# Patient Record
Sex: FEMALE | Race: WHITE | Employment: OTHER | URBAN - NONMETROPOLITAN AREA
[De-identification: names, ages, dates, MRNs, and addresses within clinical notes are randomized per-mention and may not be internally consistent; named-entity substitution may affect disease eponyms.]

---

## 2017-05-20 ENCOUNTER — NURSE TRIAGE (OUTPATIENT)
Dept: ADMINISTRATIVE | Age: 77
End: 2017-05-20

## 2017-10-17 ENCOUNTER — HOSPITAL ENCOUNTER (OUTPATIENT)
Dept: GENERAL RADIOLOGY | Age: 77
Discharge: HOME | End: 2017-10-17
Payer: MEDICARE

## 2017-10-17 ENCOUNTER — HOSPITAL ENCOUNTER (OUTPATIENT)
Age: 77
Discharge: HOME | End: 2017-10-17
Payer: MEDICARE

## 2017-10-17 DIAGNOSIS — R52 PAIN: ICD-10-CM

## 2017-10-17 PROCEDURE — 73610 X-RAY EXAM OF ANKLE: CPT

## 2017-10-17 PROCEDURE — 73630 X-RAY EXAM OF FOOT: CPT

## 2017-10-24 ENCOUNTER — HOSPITAL ENCOUNTER (OUTPATIENT)
Dept: MRI IMAGING | Age: 77
Discharge: HOME | End: 2017-10-24
Payer: MEDICARE

## 2017-10-24 DIAGNOSIS — M25.571 RIGHT ANKLE PAIN, UNSPECIFIED CHRONICITY: ICD-10-CM

## 2017-10-24 PROCEDURE — 73718 MRI LOWER EXTREMITY W/O DYE: CPT

## 2017-10-24 PROCEDURE — 73721 MRI JNT OF LWR EXTRE W/O DYE: CPT

## 2018-02-16 ENCOUNTER — HOSPITAL ENCOUNTER (OUTPATIENT)
Dept: MRI IMAGING | Age: 78
Discharge: HOME | End: 2018-02-16
Payer: MEDICARE

## 2018-02-16 DIAGNOSIS — M47.812 OSTEOARTHRITIS OF CERVICAL SPINE, UNSPECIFIED SPINAL OSTEOARTHRITIS COMPLICATION STATUS: ICD-10-CM

## 2018-02-16 PROCEDURE — 72141 MRI NECK SPINE W/O DYE: CPT

## 2018-07-06 ENCOUNTER — HOSPITAL ENCOUNTER (EMERGENCY)
Age: 78
Discharge: HOME | End: 2018-07-06
Attending: EMERGENCY MEDICINE
Payer: MEDICARE

## 2018-07-06 ENCOUNTER — APPOINTMENT (OUTPATIENT)
Dept: GENERAL RADIOLOGY | Age: 78
End: 2018-07-06
Payer: MEDICARE

## 2018-07-06 VITALS
RESPIRATION RATE: 18 BRPM | HEART RATE: 79 BPM | DIASTOLIC BLOOD PRESSURE: 88 MMHG | OXYGEN SATURATION: 96 % | TEMPERATURE: 98.4 F | WEIGHT: 266 LBS | BODY MASS INDEX: 50.22 KG/M2 | SYSTOLIC BLOOD PRESSURE: 148 MMHG | HEIGHT: 61 IN

## 2018-07-06 DIAGNOSIS — L03.011 CELLULITIS OF RIGHT MIDDLE FINGER: Primary | ICD-10-CM

## 2018-07-06 LAB
ALBUMIN SERPL-MCNC: 3.4 GM/DL (ref 3.4–5)
ALP BLD-CCNC: 79 U/L (ref 46–116)
ALT SERPL-CCNC: 25 U/L (ref 14–63)
ANION GAP: 6 MEQ/L (ref 8–16)
ANISOCYTOSIS: ABNORMAL
AST SERPL-CCNC: 16 U/L (ref 15–37)
BASOPHILS # BLD: 0.3 % (ref 0–3)
BILIRUB SERPL-MCNC: 0.5 MG/DL (ref 0.2–1)
BUN BLDV-MCNC: 23 MG/DL (ref 7–18)
CHLORIDE BLD-SCNC: 103 MEQ/L (ref 98–107)
CO2: 29 MEQ/L (ref 21–32)
CREAT SERPL-MCNC: 1 MG/DL (ref 0.6–1.3)
EOSINOPHILS RELATIVE PERCENT: 2.2 % (ref 0–4)
GFR, ESTIMATED: 57 ML/MIN/1.73M2
GLUCOSE BLD-MCNC: 171 MG/DL (ref 74–106)
HCT VFR BLD CALC: 39.5 % (ref 37–47)
HEMOGLOBIN: 12.8 GM/DL (ref 12–16)
LYMPHOCYTES # BLD: 12 % (ref 15–47)
MCH RBC QN AUTO: 30.4 PG (ref 27–31)
MCHC RBC AUTO-ENTMCNC: 32.3 GM/DL (ref 33–37)
MCV RBC AUTO: 94.1 FL (ref 81–99)
MONOCYTES: 5.5 % (ref 0–12)
PDW BLD-RTO: 16.1 % (ref 11.5–14.5)
PLATELET # BLD: 248 THOU/MM3 (ref 130–400)
PLATELET ESTIMATE: ABNORMAL
PMV BLD AUTO: 7.7 FL (ref 7.4–10.4)
POC CALCIUM: 9.4 MG/DL (ref 8.5–10.1)
POTASSIUM SERPL-SCNC: 4 MEQ/L (ref 3.5–5.1)
RBC # BLD: 4.2 MILL/MM3 (ref 4.2–5.4)
RBC # BLD: ABNORMAL 10*6/UL
SCAN OF BLOOD SMEAR: NORMAL
SEDIMENTATION RATE, ERYTHROCYTE: 27 MM/HR (ref 0–20)
SEGS: 80 % (ref 43–75)
SODIUM BLD-SCNC: 138 MEQ/L (ref 136–145)
TOTAL PROTEIN: 7.3 GM/DL (ref 6.4–8.2)
WBC # BLD: 9.5 THOU/MM3 (ref 4.8–10.8)

## 2018-07-06 PROCEDURE — 85025 COMPLETE CBC W/AUTO DIFF WBC: CPT

## 2018-07-06 PROCEDURE — 87075 CULTR BACTERIA EXCEPT BLOOD: CPT

## 2018-07-06 PROCEDURE — 87147 CULTURE TYPE IMMUNOLOGIC: CPT

## 2018-07-06 PROCEDURE — 99283 EMERGENCY DEPT VISIT LOW MDM: CPT

## 2018-07-06 PROCEDURE — 36415 COLL VENOUS BLD VENIPUNCTURE: CPT

## 2018-07-06 PROCEDURE — 87205 SMEAR GRAM STAIN: CPT

## 2018-07-06 PROCEDURE — 87070 CULTURE OTHR SPECIMN AEROBIC: CPT

## 2018-07-06 PROCEDURE — 85651 RBC SED RATE NONAUTOMATED: CPT

## 2018-07-06 PROCEDURE — 73140 X-RAY EXAM OF FINGER(S): CPT

## 2018-07-06 PROCEDURE — 80053 COMPREHEN METABOLIC PANEL: CPT

## 2018-07-06 ASSESSMENT — PAIN DESCRIPTION - DESCRIPTORS: DESCRIPTORS: ACHING

## 2018-07-06 ASSESSMENT — PAIN SCALES - GENERAL: PAINLEVEL_OUTOF10: 2

## 2018-07-06 ASSESSMENT — PAIN DESCRIPTION - ORIENTATION: ORIENTATION: LEFT

## 2018-07-06 ASSESSMENT — PAIN DESCRIPTION - FREQUENCY: FREQUENCY: CONTINUOUS

## 2018-07-06 ASSESSMENT — PAIN DESCRIPTION - PAIN TYPE: TYPE: ACUTE PAIN

## 2018-07-06 ASSESSMENT — PAIN DESCRIPTION - LOCATION: LOCATION: FINGER (COMMENT WHICH ONE)

## 2018-07-07 NOTE — ED PROVIDER NOTES
[facility]  eMERGENCY dEPARTMENT eNCOUnter             Ramon Chacko 21 COMPLAINT    Chief Complaint   Patient presents with    Wound Infection     Left middle finger       Nurses Notes reviewed and I agree except as noted in the HPI. HPI    Joan Yao is a 70 y.o. female who presents stating that about two weeks ago she sustained a minor burn to the dorsum of the left long finger over DIP joint, which already had extensive arthritic nodules. She then developed infection in the wound, and the whole finger became swollen and painful. Her doctor placed her on a Z-pack 4 days ago, and all of the redness is better, but the swollen area at the DIPJ is still very painful, with what appear to be tiny pustules forming over the area. She feels a little nauseated, and is very worried about the finger. Her sugar has sometimes been in the 300's since the infection started. Pain is currently 2/10, worse if she touches the area or does not elevate it. No drainage has been noted. REVIEW OF SYSTEMS      Review of Systems   Constitutional: Positive for malaise/fatigue. Negative for fever. Skin: Positive for itching (the finger has been itching as it heals. ). Neurological: Negative for sensory change and focal weakness. Psychiatric/Behavioral: The patient is nervous/anxious. All other systems reviewed and are negative. PAST MEDICAL HISTORY     has a past medical history of Blood clot in vein; Blood transfusion; Hypertension; Hypothyroidism; Palpitation; Parathyroid adenoma; Sinus infection; Sleep apnea; Stomach ache; Type II or unspecified type diabetes mellitus without mention of complication, not stated as uncontrolled; and Wheeze. SURGICAL HISTORY     has a past surgical history that includes Hemorrhoid surgery; knee surgery; lymph node dissection; Vena Cava Filter Placement (2007); Cardiac catheterization;  Appendectomy (age 31); partial hysterectomy (cervix not removed) (); Hysterectomy (); Breast biopsy (); Breast lumpectomy (); Dental surgery (); Neuroma surgery (); Carpal tunnel release; and back surgery (2014). CURRENT MEDICATIONS    Discharge Medication List as of 2018 11:25 PM      CONTINUE these medications which have NOT CHANGED    Details   ACETAMINOPHEN EXTRA STRENGTH PO Take 1 tablet by mouth as needed      Chromium Picolinate (CHROMIUM PICOLATE PO) Take 1,000 mcg by mouth daily      Coenzyme Q10 (COQ10) 100 MG CAPS Take 100 mg by mouth daily      insulin detemir (LEVEMIR) 100 UNIT/ML injection pen Inject 16 Units into the skin daily      insulin 70-30 (HUMULIN 70/30) (70-30) 100 UNIT/ML injection Inject 37 Units into the skin nightly      Olive Leaf Extract 250 MG CAPS Take 500 mg by mouth daily      Potassium Chloride (KLOR-CON 10 PO) Take 10 mEq by mouth daily      Vitamin E (YOHANA-PLUS E PO) Take 800 Units by mouth daily      insulin lispro (HUMALOG) 100 UNIT/ML injection vial Inject 26 Units into the skin 3 times daily      aspirin 325 MG tablet Take 325 mg by mouth daily      Insulin Lispro, Human, (HUMALOG KWIKPEN SC) Inject into the skin      metoprolol (TOPROL-XL) 25 MG XL tablet Take 25 mg by mouth daily      furosemide (LASIX) 40 MG tablet Take 1 tablet by mouth daily. , Disp-30 tablet, R-5      ALPHA-LIPOIC ACID PO Take 1 capsule by mouth daily. levothyroxine (SYNTHROID) 50 MCG tablet 2 tabs po daily         MAGNESIUM PO Take 1 capsule by mouth daily. insulin NPH (HUMULIN N) 100 UNIT/ML injection Inject 37 Units into the skin nightly. 1/2 dose last night             ALLERGIES    is allergic to codeine; meloxicam; pcn [penicillins]; clindamycin/lincomycin; exenatide; levothyroxine; minocycline; other; sulfa antibiotics; and metformin. FAMILY HISTORY    indicated that her mother is . She indicated that her father is .     family history includes Arthritis in her mother; Diabetes 16.1 (*)     SEGS 80.0 (*)     Lymphocytes 12.0 (*)     All other components within normal limits   COMPREHENSIVE METABOLIC PANEL - Abnormal; Notable for the following:     Glucose 171 (*)     BUN 23 (*)     All other components within normal limits   SEDIMENTATION RATE - Abnormal; Notable for the following:     Sed Rate 27 (*)     All other components within normal limits   GLOMERULAR FILTRATION RATE, ESTIMATED - Abnormal; Notable for the following:     GFR, Estimated 57 (*)     All other components within normal limits   ANION GAP - Abnormal; Notable for the following: Anion Gap 6.0 (*)     All other components within normal limits   ANAEROBIC AND AEROBIC CULTURE   SCAN OF BLOOD SMEAR       Vitals:    Vitals:    07/06/18 2154 07/06/18 2155 07/06/18 2350   BP:  (!) 148/88    Pulse: 79     Resp: 20  18   Temp: 98.4 °F (36.9 °C)     TempSrc: Oral     SpO2: 96%     Weight: 266 lb (120.7 kg)     Height: 5' 1\" (1.549 m)         EMERGENCY DEPARTMENT COURSE:    After discussion with the patient, I cleansed an area over one of the pustules and with the tip of a 27 ga needle opened the surface, and got cultures of the white material, scant amount. The area was cleansed again, and bandage applied. An Alumafoam splint is placed over the finger tip for protection. Plan of care discussed, orthopedic follow up encouraged. FINAL IMPRESSION      1. Cellulitis of right middle finger        DISPOSITION/PLAN    DISPOSITION Decision To Discharge 07/06/2018 11:22:35 PM      PATIENT REFERRED TO:    oio    Schedule an appointment as soon as possible for a visit   call Monday, ask to make an appointment with the hand specialist.      DISCHARGE MEDICATIONS:    Discharge Medication List as of 7/6/2018 11:25 PM       Finish Zithromax as prescribed.        (Please note that portions of this note were completed with a voice recognition program.  Efforts were made to edit the dictations but occasionally words are mis-transcribed.) Nithin Fall MD  07/07/18 0079

## 2018-07-07 NOTE — ED NOTES
Dr. Liliya Donato speaking with patient and daughter regarding test results.      Donna Kidney, LPN  72/60/24 [id number]

## 2018-07-07 NOTE — ED NOTES
Patient presents to ED with C/O left middle finger cellulitis x 2 weeks. Patient had completed antibodic treatment and was to follow up with primary Dr. But do to holiday was unable to. Today 2 nd knuckle swollen, red, blistered, no drainage noted, patient afebrile,pink and warm.      Soniya Cullen LPN  47/14/96 [id number]

## 2018-07-10 LAB
AEROBIC CULTURE: NORMAL
ANAEROBIC CULTURE: NORMAL
GRAM STAIN RESULT: NORMAL

## 2018-07-11 ENCOUNTER — HOSPITAL ENCOUNTER (EMERGENCY)
Age: 78
Discharge: HOME | End: 2018-07-11
Attending: EMERGENCY MEDICINE
Payer: MEDICARE

## 2018-07-11 VITALS
HEIGHT: 64 IN | HEART RATE: 84 BPM | SYSTOLIC BLOOD PRESSURE: 153 MMHG | WEIGHT: 266 LBS | OXYGEN SATURATION: 95 % | BODY MASS INDEX: 45.41 KG/M2 | RESPIRATION RATE: 18 BRPM | TEMPERATURE: 99.3 F | DIASTOLIC BLOOD PRESSURE: 78 MMHG

## 2018-07-11 DIAGNOSIS — L03.114 CELLULITIS OF LEFT UPPER EXTREMITY: Primary | ICD-10-CM

## 2018-07-11 PROCEDURE — 99283 EMERGENCY DEPT VISIT LOW MDM: CPT

## 2018-07-11 PROCEDURE — L3913 HFO W/O JOINTS CF: HCPCS

## 2018-07-11 ASSESSMENT — PAIN DESCRIPTION - LOCATION: LOCATION: HAND

## 2018-07-11 ASSESSMENT — PAIN DESCRIPTION - ORIENTATION: ORIENTATION: LEFT

## 2018-07-12 NOTE — ED PROVIDER NOTES
Raj Lu  [address] [address] [place] [address]  Phone: [phone]    Emergency Department encounter      CHIEF COMPLAINT    Chief Complaint   Patient presents with    Wound Infection     left hand finger       HPI    Shade Dress is a 70 y.o. female who presents Above-noted complaint. Patient has left middle finger irritation. Skull and on for about 21 days. States it started with a burn. She developed some redness and then streakiness upper arm. She has multiple allergies and was placed on Zithromax. Zithromax actually helped. The culture did show some non-MRSA species and they suggested putting on Keflex although she was leery and did not want to do it. Her primary care doctors did another course of Z-Berny.     PAST MEDICAL HISTORY    Past Medical History:   Diagnosis Date    Blood clot in vein 2005    bilateral venous legs    Blood transfusion     Hypertension     Hypothyroidism     Palpitation     Parathyroid adenoma     Sinus infection 9/12    Sleep apnea     wears CPAP    Stomach ache     recent    Type II or unspecified type diabetes mellitus without mention of complication, not stated as uncontrolled     Wheeze     recently wheezing a lot       SURGICAL HISTORY    Past Surgical History:   Procedure Laterality Date    APPENDECTOMY  age 31    BACK SURGERY  08/29/2014    done at [facility]   Dr. Byron Taylor    partial breast removed for benign cysts    BREAST LUMPECTOMY  2010    CARDIAC CATHETERIZATION      CARPAL TUNNEL RELEASE      failed   [address]  2010    implants    HEMORRHOID SURGERY      HYSTERECTOMY  1977    KNEE SURGERY      bilateral left 2007 right 2005    LYMPH NODE DISSECTION      left groin    NEUROMA SURGERY  1992    PARTIAL HYSTERECTOMY  1973    VENA CAVA FILTER PLACEMENT  2007    shad tulip filter       CURRENT MEDICATIONS    Current Outpatient Rx   Medication Sig secondary infection to the arthritic changes even a gouty tophi. Patient is adamant it is not related to gout or other inflammatory arthropathies. She is on appropriate antibiotics for her which could have good coverage on the skin although rather variable with all her allergies there is no other better options that she is willing to try. I don't feel it is septic joint and do not feel would say infected tendon. I would like her to refer to orthopedics. If they wanted incise and drain or evaluate they may want to look at this in the next 24 hours especially in light of her recent redness and continued problems. For now we'll continue to called cellulitis and arthritic       SCREENINGS  BP (!) 153/78   Pulse 84   Temp 99.3 °F (37.4 °C) (Temporal)   Resp 18   Ht 5' 4\" (1.626 m)   Wt 266 lb (120.7 kg)   SpO2 95%   BMI 45.66 kg/m²      No orders to display       Screening For Hypertension and Follow-up (#317)   previously diagnosed with hypertension and not applicable for screen      Screening For Tobacco Use and Cessation Intervention (#226):   reports that she quit smoking about 15 years ago. Her smoking use included Cigarettes. She has never used smokeless tobacco.  Non-smoker not applicable for screen        FINAL IMPRESSION    1.  Cellulitis of left upper extremity      Infected tophus    PATIENT REFERRED TO:  [address] [address]  [facility] Kameron Guzman at 12:10      DISCHARGE MEDICATIONS:  Discharge Medication List as of 7/11/2018  8:30 PM             Sheri Bedoya MD  07/11/18 [id number]

## 2018-07-12 NOTE — ED TRIAGE NOTES
Pt states seen several times for infection in left hand finger. Pain and swelling worsening with treatment.

## 2018-08-06 ENCOUNTER — OFFICE VISIT (OUTPATIENT)
Dept: PULMONOLOGY | Age: 78
End: 2018-08-06
Payer: MEDICARE

## 2018-08-06 VITALS
TEMPERATURE: 98.3 F | BODY MASS INDEX: 45.07 KG/M2 | HEIGHT: 64 IN | WEIGHT: 264 LBS | HEART RATE: 77 BPM | SYSTOLIC BLOOD PRESSURE: 128 MMHG | DIASTOLIC BLOOD PRESSURE: 61 MMHG | OXYGEN SATURATION: 96 %

## 2018-08-06 DIAGNOSIS — R06.00 DYSPNEA, UNSPECIFIED TYPE: Primary | ICD-10-CM

## 2018-08-06 DIAGNOSIS — Z72.0 TOBACCO ABUSE: ICD-10-CM

## 2018-08-06 DIAGNOSIS — R93.89 ABNORMAL CHEST XRAY: ICD-10-CM

## 2018-08-06 PROCEDURE — 1123F ACP DISCUSS/DSCN MKR DOCD: CPT | Performed by: INTERNAL MEDICINE

## 2018-08-06 PROCEDURE — 1090F PRES/ABSN URINE INCON ASSESS: CPT | Performed by: INTERNAL MEDICINE

## 2018-08-06 PROCEDURE — 1101F PT FALLS ASSESS-DOCD LE1/YR: CPT | Performed by: INTERNAL MEDICINE

## 2018-08-06 PROCEDURE — G8598 ASA/ANTIPLAT THER USED: HCPCS | Performed by: INTERNAL MEDICINE

## 2018-08-06 PROCEDURE — 1036F TOBACCO NON-USER: CPT | Performed by: INTERNAL MEDICINE

## 2018-08-06 PROCEDURE — 4040F PNEUMOC VAC/ADMIN/RCVD: CPT | Performed by: INTERNAL MEDICINE

## 2018-08-06 PROCEDURE — 99204 OFFICE O/P NEW MOD 45 MIN: CPT | Performed by: INTERNAL MEDICINE

## 2018-08-06 PROCEDURE — G8400 PT W/DXA NO RESULTS DOC: HCPCS | Performed by: INTERNAL MEDICINE

## 2018-08-06 PROCEDURE — G8417 CALC BMI ABV UP PARAM F/U: HCPCS | Performed by: INTERNAL MEDICINE

## 2018-08-06 PROCEDURE — G8427 DOCREV CUR MEDS BY ELIG CLIN: HCPCS | Performed by: INTERNAL MEDICINE

## 2018-08-06 NOTE — PROGRESS NOTES
Yes.              Quit year:2003   Current smoker: No.       History of recreational or IV drug use in the past:No     History of exposure to coal mines/coal dust: No  History of exposure to foundry dust/welding: No  History of exposure to quarry/silica/sandblasting: No  History of exposure to asbestos/working with breaks/ships: No  History of exposure to farm dust: No  History of recent travel to long distances: No  History of recent travel to birds, pigeons, or chickens in the past:No  Pet animals at home:No    History of pulmonary embolism in the past: No            History of DVT in the past:No        [] Right lower extremity   [] Left lower extremity. Review of Systems:   General/Constitutional: She gained ~30lbs of weight since 2014 after her back surgery with normal appetite. No fever or chills. HENT: Negative. Eyes: Negative. Upper respiratory tract: Frequent nasal stuffiness with post nasal drip. She is using over the counter nasal saline spray. Lower respiratory tract/ lungs: See HPI. Cardiovascular: No palpitations or chest pain. Gastrointestinal: No nausea or vomiting. Neurological: No focal neurologiacal weakness. Extremities: Chronic leg edema. Musculoskeletal: No complaints. Genitourinary: No complaints. Hematological: Negative. Psychiatric/Behavioral: Negative. Skin: No itching.        Current Medications:        Past Medical History:   Diagnosis Date    Blood clot in vein 2005    bilateral venous legs    Blood transfusion     Hypertension     Hypothyroidism     Palpitation     Parathyroid adenoma     Sinus infection 9/12    Sleep apnea     wears CPAP    Stomach ache     recent    Type II or unspecified type diabetes mellitus without mention of complication, not stated as uncontrolled     Wheeze     recently wheezing a lot       Past Surgical History:   Procedure Laterality Date    APPENDECTOMY  age 31    BACK SURGERY  08/29/2014    [facility]   Dr. Ashley Parkinson    partial breast removed for benign cysts    BREAST LUMPECTOMY  2010    CARDIAC CATHETERIZATION      CARPAL TUNNEL RELEASE      failed   [address]  2010    implants   [address]    KNEE SURGERY      bilateral left 2007 right 2005    LYMPH NODE DISSECTION      left groin    NEUROMA SURGERY  1992    PARTIAL HYSTERECTOMY  1973    VENA CAVA FILTER PLACEMENT  2007    shad tulip filter       Allergies   Allergen Reactions    Codeine Other (See Comments) and Shortness Of Breath     \"puts me on trip\"    Meloxicam Shortness Of Breath    Pcn [Penicillins] Itching and Swelling     Drop BP Passes out    Clindamycin/Lincomycin Other (See Comments)     Rectal bleeding    Exenatide Other (See Comments)     Abdominal pain    Levothyroxine Swelling and Other (See Comments)     Throat swelling    Minocycline Itching    Other Other (See Comments)     Dyes in medications coloring in pill    Sulfa Antibiotics Nausea Only    Metformin Nausea And Vomiting       Current Outpatient Prescriptions   Medication Sig Dispense Refill    ACETAMINOPHEN EXTRA STRENGTH PO Take 1 tablet by mouth as needed      Chromium Picolinate (CHROMIUM PICOLATE PO) Take 1,000 mcg by mouth daily      Coenzyme Q10 (COQ10) 100 MG CAPS Take 100 mg by mouth daily      insulin detemir (LEVEMIR) 100 UNIT/ML injection pen Inject 16 Units into the skin daily      insulin 70-30 (HUMULIN 70/30) (70-30) 100 UNIT/ML injection Inject 37 Units into the skin nightly      Olive Leaf Extract 250 MG CAPS Take 500 mg by mouth daily      Potassium Chloride (KLOR-CON 10 PO) Take 10 mEq by mouth daily      Vitamin E (YOHANA-PLUS E PO) Take 800 Units by mouth daily      insulin lispro (HUMALOG) 100 UNIT/ML injection vial Inject 26 Units into the skin 3 times daily      aspirin 325 MG tablet Take 81 mg by mouth daily       Insulin Lispro, Human, (HUMALOG KWIKPEN SC) Inject into the skin      metoprolol (TOPROL-XL) 25 MG XL tablet Take 25 mg by mouth daily      furosemide (LASIX) 40 MG tablet Take 1 tablet by mouth daily. 30 tablet 5    ALPHA-LIPOIC ACID PO Take 1 capsule by mouth daily.  levothyroxine (SYNTHROID) 50 MCG tablet 2 tabs po daily         MAGNESIUM PO Take 1 capsule by mouth daily.  insulin NPH (HUMULIN N) 100 UNIT/ML injection Inject 37 Units into the skin nightly. 1/2 dose last night       No current facility-administered medications for this visit. Family History   Problem Relation Age of Onset    Arthritis Mother     Diabetes Mother     High Cholesterol Mother     Heart Disease Father         pacemaker    High Cholesterol Father     Stroke Father         eye age 80           Physical Exam:    VITALS:  /61 (Site: Left Arm, Position: Sitting)   Pulse 77   Temp 98.3 °F (36.8 °C) (Tympanic)   Ht 5' 4\" (1.626 m)   Wt 264 lb (119.7 kg)   SpO2 96%   BMI 45.32 kg/m²   Nursing note and vitals reviewed. Constitutional: Patient appears well built and well nourished. No distress. Patient is oriented to person, place, and time. HENT:   Head: Normocephalic and atraumatic. Right Ear: External ear normal.   Left Ear: External ear normal.   Mouth/Throat: Oropharynx is clear and moist.  No oral thrush. Eyes: Conjunctivae are normal. Pupils are equal, round, and reactive to light. No scleral icterus. Neck: Neck supple. No JVD present. No tracheal deviation present. Cardiovascular: Normal rate, regular rhythm, normal heart sounds. No murmur heard. Pulmonary/Chest: Effort normal and breath sounds normal. No stridor. No respiratory distress. No wheezes. No rales. Patient exhibits no tenderness. Abdominal: Soft. Patient exhibits no distension. No tenderness. Musculoskeletal: Normal range of motion. Extremities: Patient exhibits bilateral 1+ leg edema and no tenderness.    Lymphadenopathy:  No cervical

## 2018-09-11 ENCOUNTER — HOSPITAL ENCOUNTER (OUTPATIENT)
Dept: NON INVASIVE DIAGNOSTICS | Age: 78
Discharge: HOME | End: 2018-09-11
Payer: MEDICARE

## 2018-09-11 ENCOUNTER — HOSPITAL ENCOUNTER (OUTPATIENT)
Dept: CT IMAGING | Age: 78
Discharge: HOME | End: 2018-09-11
Payer: MEDICARE

## 2018-09-11 ENCOUNTER — HOSPITAL ENCOUNTER (OUTPATIENT)
Dept: PULMONOLOGY | Age: 78
Discharge: HOME | End: 2018-09-11
Payer: MEDICARE

## 2018-09-11 DIAGNOSIS — R93.89 ABNORMAL CHEST XRAY: ICD-10-CM

## 2018-09-11 DIAGNOSIS — Z72.0 TOBACCO ABUSE: ICD-10-CM

## 2018-09-11 DIAGNOSIS — R06.00 DYSPNEA, UNSPECIFIED TYPE: ICD-10-CM

## 2018-09-11 LAB
LV EF: 58 %
LVEF MODALITY: NORMAL

## 2018-09-11 PROCEDURE — 71250 CT THORAX DX C-: CPT

## 2018-09-11 PROCEDURE — 93306 TTE W/DOPPLER COMPLETE: CPT

## 2018-09-11 PROCEDURE — 94010 BREATHING CAPACITY TEST: CPT

## 2018-09-11 PROCEDURE — 94726 PLETHYSMOGRAPHY LUNG VOLUMES: CPT

## 2018-09-11 PROCEDURE — 94729 DIFFUSING CAPACITY: CPT

## 2018-09-20 ENCOUNTER — TELEPHONE (OUTPATIENT)
Dept: PULMONOLOGY | Age: 78
End: 2018-09-20

## 2018-10-17 NOTE — PROGRESS NOTES
Final 07/06/2018 10:07 PM - [facility]   Potassium 4.0  3.5 - 5.1 meq/l Final 07/06/2018 10:07 PM - [facility]   Chloride 103  98 - 107 meq/l Final 07/06/2018 10:07 PM - [facility]   CO2 29  21 - 32 meq/l Final 07/06/2018 10:07 PM - [facility]   POC CALCIUM 9.4  8.5 - 10.1 mg/dl Final 07/06/2018 10:07 PM - [facility]   AST 16  15 - 37 U/L Final 07/06/2018 10:07 PM - [facility]   Sulfasalazine and Sulfapyridine may interfere with testing and   cause false results.  For patients taking these medications,   it is recommended that venipuncture occur prior to [place]-   tion of these drugs. Alkaline Phosphatase 79  46 - 116 U/L Final 07/06/2018 10:07 PM - [facility]   Total Protein 7.3  6.4 - 8.2 gm/dl Final 07/06/2018 10:07 PM - [facility]   Alb 3.4  3.4 - 5.0 gm/dl Final 07/06/2018 10:07 PM - [facility]   Total Bilirubin 0.5  0.2 - 1.0 mg/dl Final 07/06/2018 10:07 PM - [facility]   ALT 25  14 - 63 U/L        Echocardiogram:  9/14/2018  Transthoracic Echocardiography Report (TTE)   Right Ventricle   The right ventricular size was normal with normal systolic function and   wall thickness. Conclusions      Summary   The left ventricle was not well visualized. Left ventricle size and systolic function appears normal.   Ejection fraction was estimated at 55-60%. Unable to determine wall motion abnormalities due to poor image quality. Moderately dilated left atrium. The right ventricular size was normal with normal systolic function and   wall thickness. Mild tricuspid regurgitation. Calcification of the mitral valve noted. Trace mitral regurgitation is present.       Signature      ----------------------------------------------------------------   Electronically signed by Nisa Smith MD (Interpreting    PFTS: 09/11/18              CT chest with out contrast:  Sep 11, 2018  PROCEDURE: CT CHEST WO

## 2018-10-19 ENCOUNTER — TELEPHONE (OUTPATIENT)
Dept: PULMONOLOGY | Age: 78
End: 2018-10-19

## 2018-10-19 NOTE — TELEPHONE ENCOUNTER
I called patient at given phone number i.e Home/Mobile and spoke with her over phone. I informed in detail about the test I.e CT chest results. I advised her to keep scheduled appointment with my clinic with out fail. She verbalizes understanding.

## 2018-10-23 ENCOUNTER — OFFICE VISIT (OUTPATIENT)
Dept: PULMONOLOGY | Age: 78
End: 2018-10-23
Payer: MEDICARE

## 2018-10-23 VITALS
OXYGEN SATURATION: 95 % | HEART RATE: 72 BPM | WEIGHT: 269 LBS | BODY MASS INDEX: 49.5 KG/M2 | HEIGHT: 62 IN | SYSTOLIC BLOOD PRESSURE: 136 MMHG | DIASTOLIC BLOOD PRESSURE: 70 MMHG

## 2018-10-23 DIAGNOSIS — J44.9 MODERATE COPD (CHRONIC OBSTRUCTIVE PULMONARY DISEASE) (HCC): Primary | ICD-10-CM

## 2018-10-23 DIAGNOSIS — I50.32 CHRONIC DIASTOLIC CHF (CONGESTIVE HEART FAILURE) (HCC): ICD-10-CM

## 2018-10-23 DIAGNOSIS — J44.9 MODERATE COPD (CHRONIC OBSTRUCTIVE PULMONARY DISEASE) (HCC): ICD-10-CM

## 2018-10-23 DIAGNOSIS — R06.09 EXERTIONAL DYSPNEA: ICD-10-CM

## 2018-10-23 PROCEDURE — 3023F SPIROM DOC REV: CPT | Performed by: INTERNAL MEDICINE

## 2018-10-23 PROCEDURE — 1090F PRES/ABSN URINE INCON ASSESS: CPT | Performed by: INTERNAL MEDICINE

## 2018-10-23 PROCEDURE — 99215 OFFICE O/P EST HI 40 MIN: CPT | Performed by: INTERNAL MEDICINE

## 2018-10-23 PROCEDURE — G8400 PT W/DXA NO RESULTS DOC: HCPCS | Performed by: INTERNAL MEDICINE

## 2018-10-23 PROCEDURE — G8484 FLU IMMUNIZE NO ADMIN: HCPCS | Performed by: INTERNAL MEDICINE

## 2018-10-23 PROCEDURE — 4040F PNEUMOC VAC/ADMIN/RCVD: CPT | Performed by: INTERNAL MEDICINE

## 2018-10-23 PROCEDURE — G8417 CALC BMI ABV UP PARAM F/U: HCPCS | Performed by: INTERNAL MEDICINE

## 2018-10-23 PROCEDURE — 1036F TOBACCO NON-USER: CPT | Performed by: INTERNAL MEDICINE

## 2018-10-23 PROCEDURE — 1101F PT FALLS ASSESS-DOCD LE1/YR: CPT | Performed by: INTERNAL MEDICINE

## 2018-10-23 PROCEDURE — G8598 ASA/ANTIPLAT THER USED: HCPCS | Performed by: INTERNAL MEDICINE

## 2018-10-23 PROCEDURE — 1123F ACP DISCUSS/DSCN MKR DOCD: CPT | Performed by: INTERNAL MEDICINE

## 2018-10-23 PROCEDURE — G8926 SPIRO NO PERF OR DOC: HCPCS | Performed by: INTERNAL MEDICINE

## 2018-10-23 PROCEDURE — G8427 DOCREV CUR MEDS BY ELIG CLIN: HCPCS | Performed by: INTERNAL MEDICINE

## 2018-10-23 PROCEDURE — 94618 PULMONARY STRESS TESTING: CPT | Performed by: INTERNAL MEDICINE

## 2018-10-23 RX ORDER — ALBUTEROL SULFATE 90 UG/1
2 AEROSOL, METERED RESPIRATORY (INHALATION) EVERY 6 HOURS PRN
Qty: 3 INHALER | Refills: 3 | Status: SHIPPED | OUTPATIENT
Start: 2018-10-23 | End: 2021-11-05

## 2018-10-23 RX ORDER — ALBUTEROL SULFATE 90 UG/1
2 AEROSOL, METERED RESPIRATORY (INHALATION) EVERY 6 HOURS PRN
Qty: 1 INHALER | Refills: 11 | Status: SHIPPED | OUTPATIENT
Start: 2018-10-23 | End: 2018-10-23 | Stop reason: SDUPTHER

## 2021-10-21 ENCOUNTER — OUTSIDE SERVICES (OUTPATIENT)
Dept: FAMILY MEDICINE CLINIC | Age: 81
End: 2021-10-21
Payer: MEDICARE

## 2021-10-21 VITALS
TEMPERATURE: 97 F | OXYGEN SATURATION: 95 % | RESPIRATION RATE: 17 BRPM | SYSTOLIC BLOOD PRESSURE: 179 MMHG | DIASTOLIC BLOOD PRESSURE: 65 MMHG | HEART RATE: 65 BPM

## 2021-10-21 DIAGNOSIS — I50.32 CHRONIC DIASTOLIC CHF (CONGESTIVE HEART FAILURE) (HCC): ICD-10-CM

## 2021-10-21 DIAGNOSIS — J44.9 MODERATE COPD (CHRONIC OBSTRUCTIVE PULMONARY DISEASE) (HCC): ICD-10-CM

## 2021-10-21 DIAGNOSIS — I10 PRIMARY HYPERTENSION: ICD-10-CM

## 2021-10-21 DIAGNOSIS — E11.9 TYPE 2 DIABETES MELLITUS WITHOUT COMPLICATION, WITHOUT LONG-TERM CURRENT USE OF INSULIN (HCC): ICD-10-CM

## 2021-10-21 DIAGNOSIS — I62.00 SUBDURAL HEMORRHAGE (HCC): Primary | ICD-10-CM

## 2021-10-21 DIAGNOSIS — E03.9 ACQUIRED HYPOTHYROIDISM: ICD-10-CM

## 2021-10-21 DIAGNOSIS — I65.21 STENOSIS OF RIGHT CAROTID ARTERY: ICD-10-CM

## 2021-10-21 DIAGNOSIS — S01.01XD LACERATION OF SCALP, SUBSEQUENT ENCOUNTER: ICD-10-CM

## 2021-10-21 DIAGNOSIS — I20.9 ANGINA PECTORIS (HCC): ICD-10-CM

## 2021-10-21 DIAGNOSIS — D35.1 PARATHYROID ADENOMA: ICD-10-CM

## 2021-10-21 DIAGNOSIS — E66.09 OBESITY DUE TO EXCESS CALORIES WITH SERIOUS COMORBIDITY, UNSPECIFIED CLASSIFICATION: ICD-10-CM

## 2021-10-21 DIAGNOSIS — Z99.89 OSA ON CPAP: ICD-10-CM

## 2021-10-21 DIAGNOSIS — G47.33 OSA ON CPAP: ICD-10-CM

## 2021-10-21 DIAGNOSIS — I25.10 ASHD (ARTERIOSCLEROTIC HEART DISEASE): ICD-10-CM

## 2021-10-21 PROCEDURE — 99490 CHRNC CARE MGMT STAFF 1ST 20: CPT | Performed by: FAMILY MEDICINE

## 2021-10-21 PROCEDURE — 99306 1ST NF CARE HIGH MDM 50: CPT | Performed by: FAMILY MEDICINE

## 2021-10-21 ASSESSMENT — ENCOUNTER SYMPTOMS
BACK PAIN: 0
ABDOMINAL PAIN: 0
SORE THROAT: 0
DIARRHEA: 0
CONSTIPATION: 0
VOMITING: 0
RHINORRHEA: 0
SHORTNESS OF BREATH: 0
CHEST TIGHTNESS: 0
NAUSEA: 0
EYE DISCHARGE: 0
SINUS PRESSURE: 0

## 2021-10-21 NOTE — PROGRESS NOTES
Reanna Rodriguez is a 80 y.o. female who presents today for her medical conditions/complaints as noted below. Chief Complaint   Patient presents with    Cerebrovascular Accident           HPI:     Seeing lesli today for admission to the facility. On 10/12/21 she fell at home while trying to  her dog. He hit her head on what she thinks was a table and sustained a subdural hemorrhage and scalp laceration. She was admitted to [facility] for observation and workup. The testing done there is not available at this time. Creta Prader recalls having staples in the scalp but thinks they may have been taken out and replaced with sutures. She has a history of DM, COPD, CHF, angina, hypothyroid,  Hypertension, AMANDEEP with CPAP, obesity and right carotid stenosis among others. The patient reports a history of stroke but there is no history of that listed. She had issues with her heart and CHF this summer but the cardiologist note eludes to not being able to do surgery. There is no family present during this visit. She is very pleasant but has difficulty finding her words.       Past Medical History:   Diagnosis Date    Blood clot in vein 2005    bilateral venous legs    Blood transfusion     Hyperlipidemia     Hypertension     Hypothyroidism     Palpitation     Parathyroid adenoma     Sinus infection 9/12    Sleep apnea     wears CPAP    Stomach ache     recent    Type II or unspecified type diabetes mellitus without mention of complication, not stated as uncontrolled     Wheeze     recently wheezing a lot      Past Surgical History:   Procedure Laterality Date    APPENDECTOMY  age 31    BACK SURGERY  08/29/2014    done at [facility]   Dr. Bryson Seip    partial breast removed for benign cysts    BREAST LUMPECTOMY  2010    CARDIAC CATHETERIZATION      CARPAL TUNNEL RELEASE      failed   [facility]  2010    implants    HEMORRHOID SURGERY      HYSTERECTOMY  1977    KNEE SURGERY      bilateral left 2007 right 2005    LYMPH NODE DISSECTION      left groin    NEUROMA SURGERY  1992    PARTIAL HYSTERECTOMY  1973    VENA CAVA FILTER PLACEMENT  2007    shad tulip filter       Family History   Problem Relation Age of Onset    Arthritis Mother     Diabetes Mother     High Cholesterol Mother     Heart Disease Father         pacemaker    High Cholesterol Father     Stroke Father         eye age 80       Social History     Tobacco Use    Smoking status: Former Smoker     Types: Cigarettes     Quit date: 2003     Years since quittin.3    Smokeless tobacco: Never Used   Substance Use Topics    Alcohol use: Yes     Comment: occassionally      Allergies   Allergen Reactions    Codeine Other (See Comments) and Shortness Of Breath     \"puts me on trip\"    Meloxicam Shortness Of Breath    Pcn [Penicillins] Itching and Swelling     Drop BP Passes out    Clindamycin/Lincomycin Other (See Comments)     Rectal bleeding    Exenatide Other (See Comments)     Abdominal pain    Levothyroxine Swelling and Other (See Comments)     Throat swelling    Minocycline Itching    Other Other (See Comments)     Dyes in medications coloring in pill    Sulfa Antibiotics Nausea Only    Metformin Nausea And Vomiting       Health Maintenance   Topic Date Due    Hepatitis C screen  Never done    COVID-19 Vaccine (1) Never done    DTaP/Tdap/Td vaccine (1 - Tdap) Never done    Shingles Vaccine (1 of 2) Never done    DEXA (modify frequency per FRAX score)  Never done    TSH testing  2018    Annual Wellness Visit (AWV)  Never done    Potassium monitoring  2019    Creatinine monitoring  2019    Flu vaccine (1) Never done    Pneumococcal 65+ years Vaccine  Completed    Hepatitis A vaccine  Aged Out    Hib vaccine  Aged Out    Meningococcal (ACWY) vaccine  Aged Out       Subjective:      Review of Systems   Constitutional: Negative for activity change, appetite change, chills, fatigue and fever. HENT: Negative for congestion, ear pain, postnasal drip, rhinorrhea, sinus pressure and sore throat. Eyes: Negative for discharge. Respiratory: Negative for chest tightness and shortness of breath. Cardiovascular: Negative for chest pain and palpitations. Gastrointestinal: Negative for abdominal pain, constipation, diarrhea, nausea and vomiting. Genitourinary: Negative for difficulty urinating, frequency and urgency. Musculoskeletal: Positive for gait problem (general weakness). Negative for back pain. Skin: Positive for pallor. Neurological: Positive for speech difficulty. Negative for dizziness, light-headedness and numbness. Psychiatric/Behavioral: Positive for confusion and sleep disturbance. Negative for agitation. Objective:     Physical Exam  Constitutional:       General: She is not in acute distress. Appearance: She is obese. She is not ill-appearing, toxic-appearing or diaphoretic. HENT:      Head: Normocephalic. Nose: Nose normal. No congestion or rhinorrhea. Eyes:      General:         Right eye: No discharge. Left eye: No discharge. Pupils: Pupils are equal, round, and reactive to light. Cardiovascular:      Rate and Rhythm: Normal rate and regular rhythm. Heart sounds: No murmur heard. No friction rub. No gallop. Pulmonary:      Effort: Pulmonary effort is normal. No respiratory distress. Breath sounds: No stridor. No wheezing, rhonchi or rales. Chest:      Chest wall: No tenderness. Abdominal:      General: Bowel sounds are normal.      Palpations: Abdomen is soft. Musculoskeletal:         General: Swelling (1+ legs) present. Normal range of motion. Cervical back: No rigidity or tenderness. Lymphadenopathy:      Cervical: No cervical adenopathy. Skin:     General: Skin is warm. Capillary Refill: Capillary refill takes 2 to 3 seconds.       Coloration: Skin is not jaundiced or pale. Findings: No bruising, erythema, lesion or rash. Neurological:      General: No focal deficit present. Mental Status: She is alert and oriented to person, place, and time. Motor: Weakness present. Gait: Gait abnormal.   Psychiatric:         Mood and Affect: Mood normal.         Behavior: Behavior normal.       BP (!) 179/65   Pulse 65   Temp 97 °F (36.1 °C)   Resp 17   SpO2 95%     Assessment/Plan      1. Subdural hemorrhage ([facility] [address]   Will obtain the CT results  PT OT eval and treat   2. Type 2 diabetes mellitus without complication, without long-term current use of insulin ([facility])   Check AC and HS and adjust insulin accordingly   3. Moderate COPD (chronic obstructive pulmonary disease) ([facility])   Watch pulse ox   4. Chronic diastolic CHF (congestive heart failure) ([facility])   Compression stockings daily   5. Angina pectoris ([address]   Watch for symptoms   6. Acquired hypothyroidism   Check lab   7. Primary hypertension   Watch as she gets more active with PT   8. AMANDEEP on CPAP   continue   9. Obesity due to excess calories with serious comorbidity, unspecified classification    10. ASHD (arteriosclerotic heart disease)   Continue with cardiology   11. Stenosis of right carotid artery    12. Laceration of scalp, subsequent encounter   Wound care orders given  Assess for suture removal on 10/26   13. Parathyroid adenoma   check PTH         Patient seen and examined. History partially obtained by chart review and nursing notes. I have reviewed patient's past medical, surgical, social, and family history and have made updates where appropriate. See facility EMR for updated medication list.          Over 25 minutes spent with patient with >50% spent in counseling and coordination of care, chart review, results review, and coordinating with the nursing and PT staff.     Electronically signed by Shilo Luong MD on 10/21/2021 at 1:43 PM

## 2022-05-31 ENCOUNTER — ANESTHESIA (OUTPATIENT)
Dept: ENDOSCOPY | Age: 82
End: 2022-05-31
Payer: MEDICARE

## 2022-05-31 ENCOUNTER — HOSPITAL ENCOUNTER (OUTPATIENT)
Age: 82
Setting detail: OUTPATIENT SURGERY
Discharge: HOME | End: 2022-05-31
Attending: INTERNAL MEDICINE | Admitting: INTERNAL MEDICINE
Payer: MEDICARE

## 2022-05-31 ENCOUNTER — ANESTHESIA EVENT (OUTPATIENT)
Dept: ENDOSCOPY | Age: 82
End: 2022-05-31
Payer: MEDICARE

## 2022-05-31 VITALS
RESPIRATION RATE: 18 BRPM | DIASTOLIC BLOOD PRESSURE: 60 MMHG | HEIGHT: 60 IN | WEIGHT: 229 LBS | BODY MASS INDEX: 44.96 KG/M2 | SYSTOLIC BLOOD PRESSURE: 123 MMHG | HEART RATE: 71 BPM | TEMPERATURE: 96.4 F | OXYGEN SATURATION: 100 %

## 2022-05-31 LAB — GLUCOSE BLD-MCNC: 167 MG/DL (ref 70–108)

## 2022-05-31 PROCEDURE — 3700000001 HC ADD 15 MINUTES (ANESTHESIA): Performed by: INTERNAL MEDICINE

## 2022-05-31 PROCEDURE — 3609012400 HC EGD TRANSORAL BIOPSY SINGLE/MULTIPLE: Performed by: INTERNAL MEDICINE

## 2022-05-31 PROCEDURE — 7100000011 HC PHASE II RECOVERY - ADDTL 15 MIN: Performed by: INTERNAL MEDICINE

## 2022-05-31 PROCEDURE — 3700000000 HC ANESTHESIA ATTENDED CARE: Performed by: INTERNAL MEDICINE

## 2022-05-31 PROCEDURE — 3609017100 HC EGD: Performed by: INTERNAL MEDICINE

## 2022-05-31 PROCEDURE — 2709999900 HC NON-CHARGEABLE SUPPLY: Performed by: INTERNAL MEDICINE

## 2022-05-31 PROCEDURE — 6360000002 HC RX W HCPCS: Performed by: NURSE ANESTHETIST, CERTIFIED REGISTERED

## 2022-05-31 PROCEDURE — 3609027000 HC COLONOSCOPY: Performed by: INTERNAL MEDICINE

## 2022-05-31 PROCEDURE — 2500000003 HC RX 250 WO HCPCS: Performed by: NURSE ANESTHETIST, CERTIFIED REGISTERED

## 2022-05-31 PROCEDURE — 88305 TISSUE EXAM BY PATHOLOGIST: CPT

## 2022-05-31 PROCEDURE — 3609010600 HC COLONOSCOPY POLYPECTOMY SNARE/COLD BIOPSY: Performed by: INTERNAL MEDICINE

## 2022-05-31 PROCEDURE — 7100000010 HC PHASE II RECOVERY - FIRST 15 MIN: Performed by: INTERNAL MEDICINE

## 2022-05-31 PROCEDURE — 82948 REAGENT STRIP/BLOOD GLUCOSE: CPT

## 2022-05-31 PROCEDURE — 3609010300 HC COLONOSCOPY W/BIOPSY SINGLE/MULTIPLE: Performed by: INTERNAL MEDICINE

## 2022-05-31 PROCEDURE — 2580000003 HC RX 258: Performed by: INTERNAL MEDICINE

## 2022-05-31 PROCEDURE — 2580000003 HC RX 258: Performed by: NURSE ANESTHETIST, CERTIFIED REGISTERED

## 2022-05-31 RX ORDER — SODIUM CHLORIDE 9 MG/ML
25 INJECTION, SOLUTION INTRAVENOUS PRN
Status: DISCONTINUED | OUTPATIENT
Start: 2022-05-31 | End: 2022-05-31 | Stop reason: HOSPADM

## 2022-05-31 RX ORDER — SODIUM CHLORIDE 9 MG/ML
INJECTION, SOLUTION INTRAVENOUS CONTINUOUS
Status: DISCONTINUED | OUTPATIENT
Start: 2022-05-31 | End: 2022-05-31 | Stop reason: HOSPADM

## 2022-05-31 RX ORDER — LIDOCAINE HYDROCHLORIDE 20 MG/ML
INJECTION, SOLUTION INFILTRATION; PERINEURAL PRN
Status: DISCONTINUED | OUTPATIENT
Start: 2022-05-31 | End: 2022-05-31 | Stop reason: SDUPTHER

## 2022-05-31 RX ORDER — SODIUM CHLORIDE 0.9 % (FLUSH) 0.9 %
5-40 SYRINGE (ML) INJECTION EVERY 12 HOURS SCHEDULED
Status: DISCONTINUED | OUTPATIENT
Start: 2022-05-31 | End: 2022-05-31 | Stop reason: HOSPADM

## 2022-05-31 RX ORDER — SODIUM CHLORIDE 0.9 % (FLUSH) 0.9 %
5-40 SYRINGE (ML) INJECTION PRN
Status: DISCONTINUED | OUTPATIENT
Start: 2022-05-31 | End: 2022-05-31 | Stop reason: HOSPADM

## 2022-05-31 RX ORDER — SODIUM CHLORIDE 9 MG/ML
INJECTION, SOLUTION INTRAVENOUS CONTINUOUS PRN
Status: DISCONTINUED | OUTPATIENT
Start: 2022-05-31 | End: 2022-05-31 | Stop reason: SDUPTHER

## 2022-05-31 RX ORDER — PROPOFOL 10 MG/ML
INJECTION, EMULSION INTRAVENOUS PRN
Status: DISCONTINUED | OUTPATIENT
Start: 2022-05-31 | End: 2022-05-31 | Stop reason: SDUPTHER

## 2022-05-31 RX ADMIN — LIDOCAINE HYDROCHLORIDE 140 MG: 20 INJECTION, SOLUTION INFILTRATION; PERINEURAL at 13:46

## 2022-05-31 RX ADMIN — SODIUM CHLORIDE 25 ML: 9 INJECTION, SOLUTION INTRAVENOUS at 13:32

## 2022-05-31 RX ADMIN — PROPOFOL 50 MG: 10 INJECTION, EMULSION INTRAVENOUS at 13:56

## 2022-05-31 RX ADMIN — PROPOFOL 30 MG: 10 INJECTION, EMULSION INTRAVENOUS at 14:11

## 2022-05-31 RX ADMIN — PROPOFOL 50 MG: 10 INJECTION, EMULSION INTRAVENOUS at 13:46

## 2022-05-31 RX ADMIN — PROPOFOL 50 MG: 10 INJECTION, EMULSION INTRAVENOUS at 13:49

## 2022-05-31 RX ADMIN — SODIUM CHLORIDE: 9 INJECTION, SOLUTION INTRAVENOUS at 13:45

## 2022-05-31 RX ADMIN — PHENYLEPHRINE HYDROCHLORIDE 100 MCG: 10 INJECTION INTRAVENOUS at 14:09

## 2022-05-31 ASSESSMENT — PAIN - FUNCTIONAL ASSESSMENT: PAIN_FUNCTIONAL_ASSESSMENT: 0-10

## 2022-05-31 ASSESSMENT — PAIN SCALES - GENERAL: PAINLEVEL_OUTOF10: 0

## 2022-05-31 ASSESSMENT — COPD QUESTIONNAIRES: CAT_SEVERITY: NO INTERVAL CHANGE

## 2022-05-31 NOTE — POST SEDATION
[facility]  Sedation/Analgesia Post Sedation Record    Patient: Trinidad [place]: 1940  Med Rec#: [id number] Acc#: [id number]   Procedure Performed By: Jay Ortega MD  Primary Care Physician: Connie Khanna MD    POST-PROCEDURE    Physicians/Assistants: Jay Ortega MD  Procedure Performed:    Sedation/Anesthesia:     Estimated Blood Loss:          ml  Specimens Removed:  []None [x]Other:      Disposition of Specimen:  [x]Pathology []Other      Complications:   [x]None Immediate []Other:     Post-procedure Diagnosis/Findings:           Polyps, gerdRecommendations:               Jay Ortega MD, MD [facility]  Electronically signed 5/31/2022 at 2:19 PM

## 2022-05-31 NOTE — H&P
[facility]  Sedation/Analgesia History & Physical    Patient: Francia Mend: 1940  Med Rec#: [id number] Acc#: [id number]   Provider Performing Procedure: Sai James MD  Primary Care Physician: Sunita Marcelino MD    PRE-PROCEDURE   Full CODE [x]Yes  DNR-CCA/DNR-CC []Yes   Brief History/Pre-Procedure Diagnosis:polyp, anemia          MEDICAL HISTORY  []CAD/Valve  []Liver Disease  []Lung Disease []Diabetes  []Hypertension []Renal Disease  []Additional information:       has a past medical history of Blood clot in vein, Blood transfusion, Hyperlipidemia, Hypertension, Hypothyroidism, Palpitation, Parathyroid adenoma, Sinus infection, Sleep apnea, Stomach ache, Type II or unspecified type diabetes mellitus without mention of complication, not stated as uncontrolled, and Wheeze. SURGICAL HISTORY   has a past surgical history that includes Hemorrhoid surgery; knee surgery; lymph node dissection; Vena Cava Filter Placement (2007); Cardiac catheterization; Appendectomy (age 31); partial hysterectomy (cervix not removed) (1973); Hysterectomy (1977); Breast biopsy (1978); Breast lumpectomy (2010); Dental surgery (2010); Neuroma surgery (1992); Carpal tunnel release; and back surgery (08/29/2014).   Additional information:       ALLERGIES   Allergies as of 04/27/2022 - Fully Reviewed 01/09/2022   Allergen Reaction Noted    Codeine Other (See Comments) and Shortness Of Breath 06/05/2012    Meloxicam Shortness Of Breath 08/03/2016    Pcn [penicillins] Itching and Swelling 06/05/2012    Clindamycin/lincomycin Other (See Comments) 06/05/2012    Exenatide Other (See Comments) 08/03/2016    Levothyroxine Swelling and Other (See Comments) 08/03/2016    Minocycline Itching 08/03/2016    Other Other (See Comments) 06/13/2012    Sulfa antibiotics Nausea Only 06/05/2012    Metformin Nausea And Vomiting 08/03/2016     Additional information:       MEDICATIONS   Coumadin Use Last 7 Days [x]No []Yes  Antiplatelet drug therapy use last 7 days  [x]No []Yes  Other anticoagulant use last 7 days  [x]No []Yes  No current facility-administered medications for this encounter. Prior to Admission medications    Medication Sig Start Date End Date Taking?  Authorizing Provider   levothyroxine (SYNTHROID) 50 MCG tablet TAKE 2 TABS ON Monday, Tuesday, Wednesday,THURSDAY,FRIDAY, AND Saturday, AND TAKE 3 TABS ON SUNDAY 5/28/22   Minor Pride MD   losartan (COZAAR) 50 MG tablet Take 1 tablet by mouth once daily 2/11/22   Minor Pride MD   lisinopril (PRINIVIL;ZESTRIL) 10 MG tablet Take 10 mg by mouth daily    Historical Provider, MD   potassium chloride (KLOR-CON 10) 10 MEQ extended release tablet Take 1 tablet by mouth daily 1/17/22   Minor Pride MD   atorvastatin (LIPITOR) 40 MG tablet Take 1 tablet by mouth nightly 12/15/21   Minor Pride MD   divalproex (DEPAKOTE) 250 MG DR tablet Take 1 tablet by mouth daily 12/15/21   Minor Pride MD   aspirin EC 81 MG EC tablet Take 1 tablet by mouth daily 12/15/21   Minor Pride MD   furosemide (LASIX) 40 MG tablet Take 1 tablet by mouth daily 12/15/21   Minor Pride MD   pantoprazole (PROTONIX) 40 MG tablet Take 1 tablet by mouth every morning (before breakfast) 11/30/21   Minor Pride MD   albuterol-ipratropium (COMBIVENT RESPIMAT)  MCG/ACT AERS inhaler Inhale 1 puff into the lungs every 6 hours 11/30/21   Minor Pride MD   lactulose (CHRONULAC) 10 GM/15ML solution Take 15 mLs by mouth every evening 11/5/21   Minor Pride MD   albuterol sulfate HFA (VENTOLIN HFA) 108 (90 Base) MCG/ACT inhaler Inhale 2 puffs into the lungs every 6 hours as needed for Wheezing or Shortness of Breath 10/23/18 11/5/21  Sana Brown MD   ACETAMINOPHEN EXTRA STRENGTH PO Take 1 tablet by mouth as needed    Historical Provider, MD   Chromium Picolinate (CHROMIUM PICOLATE PO) Take 1,000 mcg by mouth daily    Historical Provider, MD   Coenzyme Q10 (COQ10) 100 MG CAPS Take 100 mg by mouth daily    Historical Provider, MD   insulin detemir (LEVEMIR) 100 UNIT/ML injection pen Inject 18 Units into the skin daily     Historical Provider, MD   Olive Leaf Extract 250 MG CAPS Take 500 mg by mouth daily    Historical Provider, MD   Vitamin E (YOHANA-PLUS E PO) Take 800 Units by mouth daily    Historical Provider, MD   Insulin Lispro, Human, (HUMALOG KWIKPEN SC) Inject into the skin     Historical Provider, MD   ALPHA-LIPOIC ACID PO Take 1 capsule by mouth daily. Historical Provider, MD   MAGNESIUM PO Take 1 capsule by mouth daily. Historical Provider, MD     Additional information:       PHYSICAL:   Heart:  [x]Regular rate and rhythm  []Other:    Lungs:  [x]Clear    []Other:    Abdomen: [x]Soft    []Other:    Mental Status: [x]Alert & Oriented  []Other:      VITAL SIGNS   No data found. PLANNED PROCEDURE  [x]EGD  [x]Colonoscopy []Flex Sigmoid  []ERCP []EUS   []Cystoscopy  [] CATH [] BRONCH   Consent: I have discussed with the patient and/or the patient representative the indication, alternatives, and the possible risks and/or complications of the planned procedure and the anesthesia methods. The patient and/or patient representative appear to understand and agree to proceed. SEDATION  Planned agent:[]Midazolam []Meperidine []Sublimaze []Morphine  []Diazepam [x]Propofol  []Other:     ASA Classification: Class 3 - A patient with severe systemic disease that limits activity but is not incapacitating    Airway Assessment: normal    Monitoring and Safety: The patient will be placed on a cardiac monitor and vital signs, pulse oximetry and level of consciousness will be continuously evaluated throughout the procedure. The patient will be closely monitored until recovery from the medications is complete and the patient has returned to baseline status. Respiratory therapy will be on standby during the procedure.     [x]Pre-procedure diagnostic studies complete and results available. Comment:    [x]Previous sedation/anesthesia experiences assessed. Comment:    [x]The patient is an appropriate candidate to undergo the planned procedure sedation and anesthesia. (Refer to nursing sedation/analgesia documentation record)  [x]Formulation and discussion of sedation/procedure plan, risks, and expectations with patient and/or responsible adult completed. [x]Patient examined immediately prior to the procedure.  (Refer to nursing sedation/analgesia documentation record)    Karen Alberto MD, MD   Electronically signed 5/31/2022 at 12:48 PM

## 2022-05-31 NOTE — ANESTHESIA PRE PROCEDURE
Department of Anesthesiology  Preprocedure Note       Name:  Fadumo Wong   Age:  [de-identified] y.o.  :  1940                                          MRN:  [id number]         Date:  2022      Surgeon: Portia Ramirez):  Yany Soto MD    Procedure: Procedure(s):  COLONOSCOPY  EGD    Medications prior to admission:   Prior to Admission medications    Medication Sig Start Date End Date Taking?  Authorizing Provider   levothyroxine (SYNTHROID) 50 MCG tablet TAKE 2 TABS ON Monday, Tuesday, Wednesday,THURSDAY,FRIDAY, AND Saturday, AND TAKE 3 TABS ON 22 MD Anisa   losartan (COZAAR) 50 MG tablet Take 1 tablet by mouth once daily 22 MD Anisa   lisinopril (PRINIVIL;ZESTRIL) 10 MG tablet Take 10 mg by mouth daily    Historical Provider, MD   potassium chloride (KLOR-CON 10) 10 MEQ extended release tablet Take 1 tablet by mouth daily 22 MD Anisa   atorvastatin (LIPITOR) 40 MG tablet Take 1 tablet by mouth nightly 12/15/21   Ritchie MD Anisa   divalproex (DEPAKOTE) 250 MG DR tablet Take 1 tablet by mouth daily 12/15/21   Ritchie MD Anisa   aspirin EC 81 MG EC tablet Take 1 tablet by mouth daily 12/15/21   Ritchie MD Anisa   furosemide (LASIX) 40 MG tablet Take 1 tablet by mouth daily 12/15/21   Ritchie MD Anisa   pantoprazole (PROTONIX) 40 MG tablet Take 1 tablet by mouth every morning (before breakfast) 21 MD Anisa   albuterol-ipratropium (COMBIVENT RESPIMAT)  MCG/ACT AERS inhaler Inhale 1 puff into the lungs every 6 hours 21 MD Anisa   lactulose (CHRONULAC) 10 GM/15ML solution Take 15 mLs by mouth every evening 21 MD Anisa   albuterol sulfate HFA (VENTOLIN HFA) 108 (90 Base) MCG/ACT inhaler Inhale 2 puffs into the lungs every 6 hours as needed for Wheezing or Shortness of Breath 10/23/18 11/5/21  Anamaria Arteaga MD   ACETAMINOPHEN EXTRA STRENGTH PO Take 1 tablet by mouth as needed    Historical Provider, MD   Chromium Picolinate (CHROMIUM PICOLATE PO) Take 1,000 mcg by mouth daily    Historical Provider, MD   Coenzyme Q10 (COQ10) 100 MG CAPS Take 100 mg by mouth daily    Historical Provider, MD   insulin detemir (LEVEMIR) 100 UNIT/ML injection pen Inject 18 Units into the skin daily     Historical Provider, MD   Olive Leaf Extract 250 MG CAPS Take 500 mg by mouth daily    Historical Provider, MD   Vitamin E (YOHANA-PLUS E PO) Take 800 Units by mouth daily    Historical Provider, MD   Insulin Lispro, Human, (HUMALOG KWIKPEN SC) Inject into the skin     Historical Provider, MD   ALPHA-LIPOIC ACID PO Take 1 capsule by mouth daily. Historical Provider, MD   MAGNESIUM PO Take 1 capsule by mouth daily. Historical Provider, MD       Current medications:    No current facility-administered medications for this encounter. Allergies:     Allergies   Allergen Reactions    Codeine Other (See Comments) and Shortness Of Breath     \"puts me on trip\"    Meloxicam Shortness Of Breath    Pcn [Penicillins] Itching and Swelling     Drop BP Passes out    Clindamycin/Lincomycin Other (See Comments)     Rectal bleeding    Exenatide Other (See Comments)     Abdominal pain    Levothyroxine Swelling and Other (See Comments)     Throat swelling    Minocycline Itching    Other Other (See Comments)     Dyes in medications coloring in pill    Sulfa Antibiotics Nausea Only    Metformin Nausea And Vomiting       Problem List:    Patient Active Problem List   Diagnosis Code    Type 2 diabetes mellitus without complication, without long-term current use of insulin ([facility]) E11.9    Hypothyroidism E03.9    Hypertension I10    AMANDEEP on CPAP G47.33, Z99.89    Obesity E66.9    Angina pectoris ([facility]) I20.9    ASHD (arteriosclerotic heart disease) I25.10    Carotid stenosis I65.29    Moderate COPD (chronic obstructive pulmonary disease) ([facility]) J44.9    Chronic diastolic CHF (congestive heart failure) ([facility]) I50.32    Parathyroid adenoma D35.1       Past Medical History:        Diagnosis Date    Blood clot in vein     bilateral venous legs    Blood transfusion     Hyperlipidemia     Hypertension     Hypothyroidism     Palpitation     Parathyroid adenoma     Sinus infection     Sleep apnea     wears CPAP    Stomach ache     recent    Type II or unspecified type diabetes mellitus without mention of complication, not stated as uncontrolled     Wheeze     recently wheezing a lot       Past Surgical History:        Procedure Laterality Date    APPENDECTOMY  age 31    BACK SURGERY  2014    done at [facility]   Dr. Castle Drain    partial breast removed for benign cysts    BREAST LUMPECTOMY  2010    CARDIAC CATHETERIZATION      CARPAL TUNNEL RELEASE      failed   [facility]  2010    implants    HEMORRHOID SURGERY      HYSTERECTOMY  1977    KNEE SURGERY      bilateral left 2007 right 2005    LYMPH NODE DISSECTION      left groin    NEUROMA SURGERY  1992    PARTIAL HYSTERECTOMY  1973    VENA CAVA FILTER PLACEMENT  2007    shad tulip filter       Social History:    Social History     Tobacco Use    Smoking status: Former Smoker     Types: Cigarettes     Quit date: 2003     Years since quittin.9    Smokeless tobacco: Never Used   Substance Use Topics    Alcohol use: Yes     Comment: occassionally                                Counseling given: Not Answered      Vital Signs (Current): There were no vitals filed for this visit.                                            BP Readings from Last 3 Encounters:   22 136/66   21 138/62   21 (!) 104/52       NPO Status:                                                                                 BMI:   Wt Readings from Last 3 Encounters:   21 240 lb (108.9 kg)   10/23/18 269 lb (122 kg)   18 264 lb (119.7 kg)     There is no height or weight on file to calculate BMI.    CBC:   Lab Results   Component Value Date    WBC 9.5 07/06/2018    RBC 4.20 07/06/2018    HGB 12.8 07/06/2018    HCT 39.5 07/06/2018    MCV 94.1 07/06/2018    RDW 16.1 07/06/2018     07/06/2018       CMP:   Lab Results   Component Value Date     07/06/2018    K 4.0 07/06/2018     07/06/2018    CO2 29 07/06/2018    BUN 23 07/06/2018    CREATININE 1.0 07/06/2018    LABGLOM 82 05/17/2017    GLUCOSE 171 07/06/2018    PROT 7.3 07/06/2018    CALCIUM 9.3 05/17/2017    BILITOT 0.5 07/06/2018    ALKPHOS 79 07/06/2018    AST 16 07/06/2018    ALT 25 07/06/2018       POC Tests: No results for input(s): POCGLU, POCNA, POCK, POCCL, POCBUN, POCHEMO, POCHCT in the last 72 hours. Coags: No results found for: PROTIME, INR, APTT    HCG (If Applicable): No results found for: PREGTESTUR, PREGSERUM, HCG, HCGQUANT     ABGs: No results found for: PHART, PO2ART, QSR4QZM, UZF0NLY, BEART, N4ZMHDAS     Type & Screen (If Applicable):  Lab Results   Component Value Date    LABRH POS 09/28/2012       Drug/Infectious Status (If Applicable):  Lab Results   Component Value Date    HEPCAB Non-Reactive 11/30/2021       COVID-19 Screening (If Applicable): No results found for: COVID19        Anesthesia Evaluation  Patient summary reviewed  Airway: Mallampati: II  TM distance: <3 FB   Neck ROM: limited  Mouth opening: > = 3 FB   Dental: normal exam         Pulmonary:normal exam  breath sounds clear to auscultation  (+) COPD: no interval change,  sleep apnea: on CPAP,                             Cardiovascular:  Exercise tolerance: poor (<4 METS),   (+) hypertension: no interval change, CAD: no interval change,         Rhythm: regular  Rate: normal                    Neuro/Psych:   Negative Neuro/Psych ROS              GI/Hepatic/Renal:             Endo/Other:    (+) DiabetesType II DM, using insulin, . Abdominal:   (+) obese,     Abdomen: soft. Vascular: negative vascular ROS.          Other Findings: Anesthesia Plan      MAC     ASA 3       Induction: intravenous. Anesthetic plan and risks discussed with patient. Plan discussed with CRNA.                     MIGUE Link - CRNA   5/31/2022

## 2022-05-31 NOTE — PROGRESS NOTES
EGD complete, photos taken, 1 specimens taken by  pt tolerated procedure well    Scope Number [id number]. Colonoscopy complete, photos taken, polyps removed with hot snare and rectal bx done specimen pt tolerated procedure well. Scope Number [id number] used.

## 2022-05-31 NOTE — PROGRESS NOTES
Recovery mode, denies discomfort. Passing gas, taking fluids. Dr. Luma Cotton discussed findings with pt and . Discharge instructions provided and understanding verbalized.

## 2022-05-31 NOTE — ANESTHESIA POSTPROCEDURE EVALUATION
Department of Anesthesiology  Postprocedure Note    Patient: Alisson Ross  MRN: [id number]  YOB: 1940  Date of evaluation: 5/31/2022  Time:  2:14 PM     Procedure Summary     Date: 05/31/22 Room / Location: [address] [place] [address] / [address] [place] [address]    Anesthesia Start: 5198 Anesthesia Stop: 0600    Procedures:       COLONOSCOPY (N/A )      EGD (N/A )      EGD BIOPSY (Left Esophagus)      COLONOSCOPY POLYPECTOMY SNARE/COLD BIOPSY      COLONOSCOPY WITH BIOPSY Diagnosis: (Anemia)    Surgeons: Danyelle Smith MD Responsible Provider: dELiAs, Mippin    Anesthesia Type: MAC ASA Status: 3          Anesthesia Type: MAC    Summer Phase I: Summer Score: 10    Summer Phase II:      Last vitals: Reviewed and per EMR flowsheets.        Anesthesia Post Evaluation    Patient location during evaluation: bedside  Patient participation: complete - patient participated  Level of consciousness: awake  Pain score: 0  Airway patency: patent  Nausea & Vomiting: no nausea and no vomiting  Complications: no  Cardiovascular status: hemodynamically stable  Respiratory status: acceptable  Hydration status: stable

## 2022-06-01 NOTE — OP NOTE
[address] [id number]                                OPERATIVE REPORT    PATIENT NAME: Jojo Deluca                     :        1940  MED REC NO:   [id number]                           ROOM:  ACCOUNT NO:   [de-identified]                           ADMIT DATE: 2022  PROVIDER:     Omaira Phipps M.D.    Jaret Curielkvng OF PROCEDURE:  2022    SURGEON:  Omaira Phipps MD    PROCEDURE #1:  EGD plus biopsy. INDICATION FOR THE PROCEDURE:  The patient has a history of anemia. See  the recent office note and also the preop note for rest of clinical.    ASA CLASSIFICATION:  III. MEDICATION:  Per Anesthesia. BIOPSY:  Yes. PHOTOGRAPHS:  Yes. BLOOD LOSS:  For both procedure is less than 5 mL. DESCRIPTION OF THE PROCEDURE:  Informed consent was obtained after  explaining risks and benefits of the procedure and conscious sedation. Possible complications including bleeding, perforation, reaction to  medicine, but not limiting to death, were discussed. Afterwards, GIF-180 gastroscope was advanced through oropharynx,  esophagus, stomach into the duodenum. Normal-looking duodenal bulb and  second portion. Mucosa looked healthy. No signs of malabsorptive  states like celiac. No biopsies done. Scope was withdrawn. The  patient having normal antrum. Retroflex exam showed some gastritis and  biopsies were taken. Lower esophageal incompetence. Scope was  withdrawn. Irregular GE junction. Biopsies were taken. Scope was  withdrawn. The patient tolerated the procedure well. IMPRESSION:  1. Mild GERD. 2.  Gastritis. RECOMMENDATIONS:  Treat HP if it is positive. Acid suppression. The  current exam does not explain the reason for anemia. We will proceed  with colonoscopy. PROCEDURE #2:  Colonoscopy plus snare plus biopsy.     INDICATION FOR THE PROCEDURE:  The patient has history of anemia,  previous history of large adenoma. MEDICATIONS:  As above. BIOPSY:  Yes. PHOTOGRAPHS:  Yes. BLOOD LOSS:  Less than 5 mL. DESCRIPTION OF PROCEDURE:  Informed consent was obtained after  explaining risks and benefits of the procedure and conscious sedation. Possible complications including bleeding, perforation, reaction to  medicine, but not limiting to death, were discussed. Afterwards, PCF-180 colonoscope was advanced to the cecum. Landmarks  were identified. Cecal polyp removed with a hot snare, 1 cm plus,  ulcerated. Complete removal of ascending colon, had two large polyps. They were both 1 cm plus. I had to do hot snare. Good hemostasis. Polyps were not being retrieved and they had to be further dissected in  many pieces before we could remove them. No polyp or mass in the  transverse. Descending had a polyp removed with cold biopsy and sigmoid  had two polyps, one was 1 cm plus and had ulcerated top, which seemed to  have stuffed out making it somewhat worrisome, removed with hot snare,  to be cut in two pieces to remove it completely. Another small one  close by, which was also removed with snare. Rectum had a polyp removed  with cold biopsy. Tolerated the procedure well. IMPRESSION:  Multiple colon polyps, total of 7, post snare polypectomy,  post cold biopsy. RECOMMENDATIONS:  We are going to hold aspirin for next few days post  polypectomy protocol. No followup colonoscopy in view of the patient's  age, and will have an office visit with Wilevr Obregon to discuss the  biopsies. If anemia persists, small bowel capsule will be considered. Barbi Ferrell M.D.    D: 05/31/2022 14:29:09       T: 05/31/2022 19:57:03     [id number]  Job#: [id number]     Doc#: [id number]    CC:  Sabrina Woodall M.D.

## 2022-06-20 PROBLEM — K21.9 GASTROESOPHAGEAL REFLUX DISEASE: Status: ACTIVE | Noted: 2022-06-20

## 2022-06-20 PROBLEM — F41.9 ANXIETY: Status: ACTIVE | Noted: 2022-06-20

## 2023-02-18 PROBLEM — I48.91 ATRIAL FIBRILLATION, UNSPECIFIED TYPE (HCC): Status: ACTIVE | Noted: 2023-02-18

## 2023-02-24 ENCOUNTER — HOSPITAL ENCOUNTER (OUTPATIENT)
Age: 83
Discharge: HOME | End: 2023-02-24
Payer: MEDICARE

## 2023-02-24 DIAGNOSIS — I10 PRIMARY HYPERTENSION: ICD-10-CM

## 2023-02-24 LAB
ANION GAP SERPL CALC-SCNC: 10 MEQ/L (ref 8–16)
ANISOCYTOSIS BLD QL SMEAR: PRESENT
BASOPHILS ABSOLUTE: 0.1 THOU/MM3 (ref 0–0.1)
BASOPHILS NFR BLD AUTO: 0.6 %
BUN SERPL-MCNC: 26 MG/DL (ref 7–22)
CALCIUM SERPL-MCNC: 9.7 MG/DL (ref 8.5–10.5)
CHLORIDE SERPL-SCNC: 98 MEQ/L (ref 98–111)
CO2 SERPL-SCNC: 30 MEQ/L (ref 23–33)
CREAT SERPL-MCNC: 1.4 MG/DL (ref 0.4–1.2)
DEPRECATED RDW RBC AUTO: 55.7 FL (ref 35–45)
ELLIPTOCYTES: ABNORMAL
EOSINOPHIL NFR BLD AUTO: 2.2 %
EOSINOPHILS ABSOLUTE: 0.2 THOU/MM3 (ref 0–0.4)
ERYTHROCYTE [DISTWIDTH] IN BLOOD BY AUTOMATED COUNT: 17.5 % (ref 11.5–14.5)
GFR SERPL CREATININE-BSD FRML MDRD: 38 ML/MIN/1.73M2
GLUCOSE SERPL-MCNC: 141 MG/DL (ref 70–108)
HCT VFR BLD AUTO: 37.9 % (ref 37–47)
HGB BLD-MCNC: 10.9 GM/DL (ref 12–16)
HYPOCHROMIA BLD QL SMEAR: PRESENT
IMM GRANULOCYTES # BLD AUTO: 0.02 THOU/MM3 (ref 0–0.07)
IMM GRANULOCYTES NFR BLD AUTO: 0.2 %
LYMPHOCYTES ABSOLUTE: 1.5 THOU/MM3 (ref 1–4.8)
LYMPHOCYTES NFR BLD AUTO: 15.6 %
MCH RBC QN AUTO: 25.5 PG (ref 26–33)
MCHC RBC AUTO-ENTMCNC: 28.8 GM/DL (ref 32.2–35.5)
MCV RBC AUTO: 88.6 FL (ref 81–99)
MONOCYTES ABSOLUTE: 0.7 THOU/MM3 (ref 0.4–1.3)
MONOCYTES NFR BLD AUTO: 7 %
NEUTROPHILS NFR BLD AUTO: 74.4 %
NRBC BLD AUTO-RTO: 0 /100 WBC
PLATELET # BLD AUTO: 332 THOU/MM3 (ref 130–400)
PLATELET BLD QL SMEAR: ADEQUATE
PMV BLD AUTO: 10.4 FL (ref 9.4–12.4)
POLYCHROMASIA BLD QL SMEAR: ABNORMAL
POTASSIUM SERPL-SCNC: 4.2 MEQ/L (ref 3.5–5.2)
RBC # BLD AUTO: 4.28 MILL/MM3 (ref 4.2–5.4)
SCAN OF BLOOD SMEAR: NORMAL
SEGMENTED NEUTROPHILS ABSOLUTE COUNT: 7.4 THOU/MM3 (ref 1.8–7.7)
SODIUM SERPL-SCNC: 138 MEQ/L (ref 135–145)
WBC # BLD AUTO: 9.9 THOU/MM3 (ref 4.8–10.8)

## 2023-02-24 PROCEDURE — 80048 BASIC METABOLIC PNL TOTAL CA: CPT

## 2023-02-24 PROCEDURE — 36415 COLL VENOUS BLD VENIPUNCTURE: CPT

## 2023-02-24 PROCEDURE — 85025 COMPLETE CBC W/AUTO DIFF WBC: CPT

## 2023-06-20 PROBLEM — E11.40 TYPE 2 DIABETES MELLITUS WITH DIABETIC NEUROPATHY (HCC): Status: ACTIVE | Noted: 2023-06-20

## 2023-06-20 PROBLEM — E11.22 TYPE 2 DIABETES MELLITUS WITH CHRONIC KIDNEY DISEASE (HCC): Status: ACTIVE | Noted: 2023-06-20

## 2023-08-24 ENCOUNTER — HOSPITAL ENCOUNTER (OUTPATIENT)
Dept: GENERAL RADIOLOGY | Age: 83
Discharge: HOME | End: 2023-08-24
Attending: FAMILY MEDICINE
Payer: MEDICARE

## 2023-08-24 ENCOUNTER — HOSPITAL ENCOUNTER (OUTPATIENT)
Age: 83
Discharge: HOME | End: 2023-08-24
Payer: MEDICARE

## 2023-08-24 ENCOUNTER — HOSPITAL ENCOUNTER (OUTPATIENT)
Dept: MRI IMAGING | Age: 83
Discharge: HOME | End: 2023-08-24
Attending: FAMILY MEDICINE
Payer: MEDICARE

## 2023-08-24 DIAGNOSIS — E03.9 ACQUIRED HYPOTHYROIDISM: ICD-10-CM

## 2023-08-24 DIAGNOSIS — R60.9 EDEMA, UNSPECIFIED TYPE: ICD-10-CM

## 2023-08-24 DIAGNOSIS — Z51.81 MEDICATION MONITORING ENCOUNTER: ICD-10-CM

## 2023-08-24 DIAGNOSIS — R07.9 CHEST PAIN, UNSPECIFIED TYPE: ICD-10-CM

## 2023-08-24 DIAGNOSIS — M54.6 ACUTE LEFT-SIDED THORACIC BACK PAIN: ICD-10-CM

## 2023-08-24 DIAGNOSIS — I25.10 ASHD (ARTERIOSCLEROTIC HEART DISEASE): ICD-10-CM

## 2023-08-24 DIAGNOSIS — I10 PRIMARY HYPERTENSION: ICD-10-CM

## 2023-08-24 LAB
BASOPHILS ABSOLUTE: 0.1 THOU/MM3 (ref 0–0.1)
BASOPHILS NFR BLD AUTO: 0.6 %
DEPRECATED RDW RBC AUTO: 64.1 FL (ref 35–45)
EOSINOPHIL NFR BLD AUTO: 1.6 %
EOSINOPHILS ABSOLUTE: 0.2 THOU/MM3 (ref 0–0.4)
ERYTHROCYTE [DISTWIDTH] IN BLOOD BY AUTOMATED COUNT: 19.7 % (ref 11.5–14.5)
HCT VFR BLD AUTO: 41 % (ref 37–47)
HGB BLD-MCNC: 11.6 GM/DL (ref 12–16)
HYPOCHROMIA BLD QL SMEAR: PRESENT
IMM GRANULOCYTES # BLD AUTO: 0.03 THOU/MM3 (ref 0–0.07)
IMM GRANULOCYTES NFR BLD AUTO: 0.3 %
LYMPHOCYTES ABSOLUTE: 1.5 THOU/MM3 (ref 1–4.8)
LYMPHOCYTES NFR BLD AUTO: 15.4 %
MCH RBC QN AUTO: 26 PG (ref 26–33)
MCHC RBC AUTO-ENTMCNC: 28.3 GM/DL (ref 32.2–35.5)
MCV RBC AUTO: 91.9 FL (ref 81–99)
MONOCYTES ABSOLUTE: 0.7 THOU/MM3 (ref 0.4–1.3)
MONOCYTES NFR BLD AUTO: 7.4 %
NEUTROPHILS NFR BLD AUTO: 74.7 %
NRBC BLD AUTO-RTO: 0 /100 WBC
PLATELET # BLD AUTO: 297 THOU/MM3 (ref 130–400)
PLATELET BLD QL SMEAR: ADEQUATE
PMV BLD AUTO: 10.7 FL (ref 9.4–12.4)
RBC # BLD AUTO: 4.46 MILL/MM3 (ref 4.2–5.4)
SCAN OF BLOOD SMEAR: NORMAL
SEGMENTED NEUTROPHILS ABSOLUTE COUNT: 7.3 THOU/MM3 (ref 1.8–7.7)
T4 FREE SERPL-MCNC: 1.3 NG/DL (ref 0.93–1.76)
TSH SERPL DL<=0.005 MIU/L-ACNC: 2.64 UIU/ML (ref 0.4–4.2)
WBC # BLD AUTO: 9.8 THOU/MM3 (ref 4.8–10.8)

## 2023-08-24 PROCEDURE — 80061 LIPID PANEL: CPT

## 2023-08-24 PROCEDURE — 85025 COMPLETE CBC W/AUTO DIFF WBC: CPT

## 2023-08-24 PROCEDURE — 72146 MRI CHEST SPINE W/O DYE: CPT

## 2023-08-24 PROCEDURE — 80053 COMPREHEN METABOLIC PANEL: CPT

## 2023-08-24 PROCEDURE — 84443 ASSAY THYROID STIM HORMONE: CPT

## 2023-08-24 PROCEDURE — 71046 X-RAY EXAM CHEST 2 VIEWS: CPT

## 2023-08-24 PROCEDURE — 36415 COLL VENOUS BLD VENIPUNCTURE: CPT

## 2023-08-24 PROCEDURE — 82248 BILIRUBIN DIRECT: CPT

## 2023-08-24 PROCEDURE — 84439 ASSAY OF FREE THYROXINE: CPT

## 2023-08-25 LAB
ALBUMIN SERPL BCG-MCNC: 3.8 G/DL (ref 3.5–5.1)
ALP SERPL-CCNC: 113 U/L (ref 38–126)
ALT SERPL W/O P-5'-P-CCNC: < 5 U/L (ref 11–66)
ANION GAP SERPL CALC-SCNC: 10 MEQ/L (ref 8–16)
AST SERPL-CCNC: 11 U/L (ref 5–40)
BILIRUB CONJ SERPL-MCNC: < 0.2 MG/DL (ref 0–0.3)
BILIRUB SERPL-MCNC: 0.6 MG/DL (ref 0.3–1.2)
BUN SERPL-MCNC: 25 MG/DL (ref 7–22)
CALCIUM SERPL-MCNC: 9.9 MG/DL (ref 8.5–10.5)
CHLORIDE SERPL-SCNC: 100 MEQ/L (ref 98–111)
CHOLEST SERPL-MCNC: 152 MG/DL (ref 100–199)
CO2 SERPL-SCNC: 30 MEQ/L (ref 23–33)
CREAT SERPL-MCNC: 1.3 MG/DL (ref 0.4–1.2)
GFR SERPL CREATININE-BSD FRML MDRD: 41 ML/MIN/1.73M2
GLUCOSE SERPL-MCNC: 179 MG/DL (ref 70–108)
HDLC SERPL-MCNC: 67 MG/DL
LDLC SERPL CALC-MCNC: 71 MG/DL
POTASSIUM SERPL-SCNC: 4.6 MEQ/L (ref 3.5–5.2)
PROT SERPL-MCNC: 7 G/DL (ref 6.1–8)
SODIUM SERPL-SCNC: 140 MEQ/L (ref 135–145)
TRIGL SERPL-MCNC: 70 MG/DL (ref 0–199)

## 2023-08-28 ENCOUNTER — APPOINTMENT (OUTPATIENT)
Dept: GENERAL RADIOLOGY | Age: 83
DRG: 308 | End: 2023-08-28
Payer: MEDICARE

## 2023-08-28 ENCOUNTER — HOSPITAL ENCOUNTER (EMERGENCY)
Age: 83
Discharge: HOME | DRG: 308 | End: 2023-08-28
Attending: EMERGENCY MEDICINE
Payer: MEDICARE

## 2023-08-28 VITALS
SYSTOLIC BLOOD PRESSURE: 138 MMHG | BODY MASS INDEX: 43.8 KG/M2 | DIASTOLIC BLOOD PRESSURE: 99 MMHG | TEMPERATURE: 97.8 F | WEIGHT: 232 LBS | HEIGHT: 61 IN | OXYGEN SATURATION: 94 % | RESPIRATION RATE: 18 BRPM | HEART RATE: 94 BPM

## 2023-08-28 DIAGNOSIS — I48.91 ATRIAL FIBRILLATION WITH RAPID VENTRICULAR RESPONSE (HCC): Primary | ICD-10-CM

## 2023-08-28 LAB
ALBUMIN SERPL BCG-MCNC: 4 G/DL (ref 3.5–5.1)
ALP SERPL-CCNC: 129 U/L (ref 38–126)
ALT SERPL W/O P-5'-P-CCNC: 17 U/L (ref 11–66)
ANION GAP SERPL CALC-SCNC: 13 MEQ/L (ref 8–16)
AST SERPL-CCNC: 15 U/L (ref 5–40)
BASOPHILS ABSOLUTE: 0 THOU/MM3 (ref 0–0.1)
BASOPHILS NFR BLD AUTO: 0.5 %
BILIRUB CONJ SERPL-MCNC: < 0.2 MG/DL (ref 0–0.3)
BILIRUB SERPL-MCNC: 0.8 MG/DL (ref 0.3–1.2)
BUN SERPL-MCNC: 41 MG/DL (ref 7–22)
CALCIUM SERPL-MCNC: 10.2 MG/DL (ref 8.5–10.5)
CHLORIDE SERPL-SCNC: 101 MEQ/L (ref 98–111)
CO2 SERPL-SCNC: 27 MEQ/L (ref 23–33)
CREAT SERPL-MCNC: 1.4 MG/DL (ref 0.4–1.2)
DEPRECATED RDW RBC AUTO: 61.6 FL (ref 35–45)
EOSINOPHIL NFR BLD AUTO: 0.9 %
EOSINOPHILS ABSOLUTE: 0.1 THOU/MM3 (ref 0–0.4)
ERYTHROCYTE [DISTWIDTH] IN BLOOD BY AUTOMATED COUNT: 19.7 % (ref 11.5–14.5)
GFR SERPL CREATININE-BSD FRML MDRD: 38 ML/MIN/1.73M2
GLUCOSE SERPL-MCNC: 239 MG/DL (ref 70–108)
HCT VFR BLD AUTO: 40.8 % (ref 37–47)
HGB BLD-MCNC: 11.9 GM/DL (ref 12–16)
IMM GRANULOCYTES # BLD AUTO: 0.04 THOU/MM3 (ref 0–0.07)
IMM GRANULOCYTES NFR BLD AUTO: 0.4 %
LIPASE SERPL-CCNC: 34.5 U/L (ref 5.6–51.3)
LYMPHOCYTES ABSOLUTE: 1.1 THOU/MM3 (ref 1–4.8)
LYMPHOCYTES NFR BLD AUTO: 11.5 %
MAGNESIUM SERPL-MCNC: 2.4 MG/DL (ref 1.6–2.4)
MCH RBC QN AUTO: 25.9 PG (ref 26–33)
MCHC RBC AUTO-ENTMCNC: 29.2 GM/DL (ref 32.2–35.5)
MCV RBC AUTO: 88.9 FL (ref 81–99)
MONOCYTES ABSOLUTE: 0.6 THOU/MM3 (ref 0.4–1.3)
MONOCYTES NFR BLD AUTO: 6.4 %
NEUTROPHILS NFR BLD AUTO: 80.3 %
NRBC BLD AUTO-RTO: 0 /100 WBC
NT-PROBNP SERPL IA-MCNC: ABNORMAL PG/ML (ref 0–449)
OSMOLALITY SERPL CALC.SUM OF ELEC: 299.2 MOSMOL/KG (ref 275–300)
PLATELET # BLD AUTO: 325 THOU/MM3 (ref 130–400)
PMV BLD AUTO: 10.2 FL (ref 9.4–12.4)
POTASSIUM SERPL-SCNC: 4.7 MEQ/L (ref 3.5–5.2)
PROT SERPL-MCNC: 7.5 G/DL (ref 6.1–8)
PTH-INTACT SERPL-MCNC: 161.1 PG/ML (ref 15–65)
RBC # BLD AUTO: 4.59 MILL/MM3 (ref 4.2–5.4)
SEGMENTED NEUTROPHILS ABSOLUTE COUNT: 7.8 THOU/MM3 (ref 1.8–7.7)
SODIUM SERPL-SCNC: 141 MEQ/L (ref 135–145)
TROPONIN, HIGH SENSITIVITY: 39 NG/L (ref 0–12)
TROPONIN, HIGH SENSITIVITY: 43 NG/L (ref 0–12)
TSH SERPL DL<=0.005 MIU/L-ACNC: 2.61 UIU/ML (ref 0.4–4.2)
WBC # BLD AUTO: 9.7 THOU/MM3 (ref 4.8–10.8)

## 2023-08-28 PROCEDURE — 6370000000 HC RX 637 (ALT 250 FOR IP): Performed by: EMERGENCY MEDICINE

## 2023-08-28 PROCEDURE — 83690 ASSAY OF LIPASE: CPT

## 2023-08-28 PROCEDURE — 71045 X-RAY EXAM CHEST 1 VIEW: CPT

## 2023-08-28 PROCEDURE — 85025 COMPLETE CBC W/AUTO DIFF WBC: CPT

## 2023-08-28 PROCEDURE — 96361 HYDRATE IV INFUSION ADD-ON: CPT

## 2023-08-28 PROCEDURE — 2500000003 HC RX 250 WO HCPCS: Performed by: EMERGENCY MEDICINE

## 2023-08-28 PROCEDURE — 80048 BASIC METABOLIC PNL TOTAL CA: CPT

## 2023-08-28 PROCEDURE — 83735 ASSAY OF MAGNESIUM: CPT

## 2023-08-28 PROCEDURE — 84443 ASSAY THYROID STIM HORMONE: CPT

## 2023-08-28 PROCEDURE — 93010 ELECTROCARDIOGRAM REPORT: CPT | Performed by: NUCLEAR MEDICINE

## 2023-08-28 PROCEDURE — 83970 ASSAY OF PARATHORMONE: CPT

## 2023-08-28 PROCEDURE — 96374 THER/PROPH/DIAG INJ IV PUSH: CPT

## 2023-08-28 PROCEDURE — 2580000003 HC RX 258: Performed by: EMERGENCY MEDICINE

## 2023-08-28 PROCEDURE — 36415 COLL VENOUS BLD VENIPUNCTURE: CPT

## 2023-08-28 PROCEDURE — 99285 EMERGENCY DEPT VISIT HI MDM: CPT

## 2023-08-28 PROCEDURE — 83880 ASSAY OF NATRIURETIC PEPTIDE: CPT

## 2023-08-28 PROCEDURE — 80076 HEPATIC FUNCTION PANEL: CPT

## 2023-08-28 PROCEDURE — 93005 ELECTROCARDIOGRAM TRACING: CPT | Performed by: EMERGENCY MEDICINE

## 2023-08-28 PROCEDURE — 84484 ASSAY OF TROPONIN QUANT: CPT

## 2023-08-28 RX ORDER — ASPIRIN 81 MG/1
324 TABLET, CHEWABLE ORAL ONCE
Status: COMPLETED | OUTPATIENT
Start: 2023-08-28 | End: 2023-08-28

## 2023-08-28 RX ORDER — DILTIAZEM HYDROCHLORIDE 5 MG/ML
10 INJECTION INTRAVENOUS ONCE
Status: COMPLETED | OUTPATIENT
Start: 2023-08-28 | End: 2023-08-28

## 2023-08-28 RX ORDER — 0.9 % SODIUM CHLORIDE 0.9 %
1000 INTRAVENOUS SOLUTION INTRAVENOUS ONCE
Status: COMPLETED | OUTPATIENT
Start: 2023-08-28 | End: 2023-08-28

## 2023-08-28 RX ADMIN — ASPIRIN 81 MG 324 MG: 81 TABLET ORAL at 16:39

## 2023-08-28 RX ADMIN — SODIUM CHLORIDE 1000 ML: 9 INJECTION, SOLUTION INTRAVENOUS at 14:37

## 2023-08-28 RX ADMIN — DILTIAZEM HYDROCHLORIDE 10 MG: 5 INJECTION INTRAVENOUS at 14:36

## 2023-08-28 ASSESSMENT — PAIN SCALES - GENERAL: PAINLEVEL_OUTOF10: 6

## 2023-08-28 ASSESSMENT — ENCOUNTER SYMPTOMS
APNEA: 0
VOICE CHANGE: 0
COUGH: 0
EYE PAIN: 0
ABDOMINAL PAIN: 0
BACK PAIN: 0
CHEST TIGHTNESS: 0
BLOOD IN STOOL: 0
RHINORRHEA: 0
DIARRHEA: 0
EYE DISCHARGE: 0
PHOTOPHOBIA: 0
STRIDOR: 0
EYE ITCHING: 0
WHEEZING: 0
SINUS PRESSURE: 0
TROUBLE SWALLOWING: 0
NAUSEA: 0
SHORTNESS OF BREATH: 1
VOMITING: 0
ABDOMINAL DISTENTION: 0
CHOKING: 0
CONSTIPATION: 0
SORE THROAT: 0
EYE REDNESS: 0

## 2023-08-28 ASSESSMENT — PAIN DESCRIPTION - LOCATION: LOCATION: BACK;CHEST

## 2023-08-28 ASSESSMENT — PAIN - FUNCTIONAL ASSESSMENT: PAIN_FUNCTIONAL_ASSESSMENT: 0-10

## 2023-08-28 NOTE — DISCHARGE INSTRUCTIONS
Patient has a history of atrial fibrillation. She is instructed take all medications as prescribed. She is instructed to watch her blood pressure. She can decrease her lisinopril to half a tablet and start her metoprolol if she is starting to run low blood pressures. Patient is instructed to follow-up with her cardiologist as well as her primary care physician and do so within the next 1 to 2 days. She is instructed to return to the nearest emergency room immediately for any new or worsening complaints.

## 2023-08-28 NOTE — ED PROVIDER NOTES
her primary care physician. She is instructed take all her medications as prescribed. Including the metoprolol. If the patient is running low blood pressure she can cut back on her lisinopril and take the metoprolol as this will aid in her rate control. Patient also has very bad anxiety, she is supposed to be able to take her Xanax 3 times a day she has not been doing that I suggested that maybe she increase her Xanax so that this decreases her anxiety and thus the demand on her heart. Patient and daughter at bedside understood and agreed with this plan. Patient is subsequently discharged home in stable condition. Patient has a history of atrial fibrillation. She is instructed take all medications as prescribed. She is instructed to watch her blood pressure. She can decrease her lisinopril to half a tablet and start her metoprolol if she is starting to run low blood pressures. Patient is instructed to follow-up with her cardiologist as well as her primary care physician and do so within the next 1 to 2 days. She is instructed to return to the nearest emergency room immediately for any new or worsening complaints. CRITICAL CARE:   None    CONSULTS:  None none    PROCEDURES:  None    FINAL IMPRESSION      1.  Atrial fibrillation with rapid ventricular response [facility])          DISPOSITION/PLAN   Discharge    PATIENT REFERRED TO:  Isabel Brown DO  [facility]  [address]  [phone]    Call in 1 day      Rhett Goodell, MD  [address]  [phone]    Call in 1 day        DISCHARGE MEDICATIONS:  New Prescriptions    No medications on file       (Please note that portions of this note were completed with a voice recognition program.  Efforts were made to edit the dictations but occasionally words are mis-transcribed.)    DO Brian Mcmillan DO  08/28/23 1920

## 2023-08-28 NOTE — ED NOTES
Patient resting in bed with family at bedside, denies any further needs or concerns at this time. Call light in reach. Will continue to monitor.       Trinh Jeffrey RN  08/28/23 6721

## 2023-08-28 NOTE — ED NOTES
Presents to ED with complaints of chest pressure and worsening chronic back pain that began Saturday. Pt states the pain has been constant, but getting worse. EKG completed.      Trinh Jeffrey RN  08/28/23 0304

## 2023-08-28 NOTE — ED NOTES
Pt to bathroom via wheelchair, pt now back in bed and updated on plan of care. Will continue to monitor.      Trinh Jeffrey RN  08/28/23 3594

## 2023-08-29 LAB
EKG ATRIAL RATE: 284 BPM
EKG P AXIS: -117 DEGREES
EKG Q-T INTERVAL: 344 MS
EKG QRS DURATION: 74 MS
EKG QTC CALCULATION (BAZETT): 529 MS
EKG R AXIS: -13 DEGREES
EKG T AXIS: -155 DEGREES
EKG VENTRICULAR RATE: 142 BPM

## 2023-08-30 ENCOUNTER — HOSPITAL ENCOUNTER (INPATIENT)
Age: 83
LOS: 3 days | Discharge: HOME | DRG: 308 | End: 2023-09-02
Attending: EMERGENCY MEDICINE | Admitting: INTERNAL MEDICINE
Payer: MEDICARE

## 2023-08-30 ENCOUNTER — APPOINTMENT (OUTPATIENT)
Dept: GENERAL RADIOLOGY | Age: 83
DRG: 308 | End: 2023-08-30
Payer: MEDICARE

## 2023-08-30 DIAGNOSIS — E11.22 TYPE 2 DIABETES MELLITUS WITH DIABETIC CHRONIC KIDNEY DISEASE, UNSPECIFIED CKD STAGE, UNSPECIFIED WHETHER LONG TERM INSULIN USE (HCC): ICD-10-CM

## 2023-08-30 DIAGNOSIS — I48.91 ATRIAL FIBRILLATION WITH RAPID VENTRICULAR RESPONSE (HCC): Primary | ICD-10-CM

## 2023-08-30 LAB
ALBUMIN SERPL BCG-MCNC: 3.5 G/DL (ref 3.5–5.1)
ALP SERPL-CCNC: 109 U/L (ref 38–126)
ALT SERPL W/O P-5'-P-CCNC: 16 U/L (ref 11–66)
ANION GAP SERPL CALC-SCNC: 12 MEQ/L (ref 8–16)
APTT PPP: 33.3 SECONDS (ref 22–38)
AST SERPL-CCNC: 20 U/L (ref 5–40)
BASOPHILS ABSOLUTE: 0 THOU/MM3 (ref 0–0.1)
BASOPHILS NFR BLD AUTO: 0.5 %
BILIRUB CONJ SERPL-MCNC: < 0.2 MG/DL (ref 0–0.3)
BILIRUB SERPL-MCNC: 0.6 MG/DL (ref 0.3–1.2)
BILIRUB UR QL STRIP.AUTO: NEGATIVE
BUN SERPL-MCNC: 34 MG/DL (ref 7–22)
CALCIUM SERPL-MCNC: 9.6 MG/DL (ref 8.5–10.5)
CHARACTER UR: CLEAR
CHLORIDE SERPL-SCNC: 101 MEQ/L (ref 98–111)
CO2 SERPL-SCNC: 25 MEQ/L (ref 23–33)
COLOR: YELLOW
CREAT SERPL-MCNC: 1.2 MG/DL (ref 0.4–1.2)
DEPRECATED RDW RBC AUTO: 62.6 FL (ref 35–45)
EKG Q-T INTERVAL: 314 MS
EKG QRS DURATION: 80 MS
EKG QTC CALCULATION (BAZETT): 454 MS
EKG R AXIS: 27 DEGREES
EKG T AXIS: 0 DEGREES
EKG VENTRICULAR RATE: 126 BPM
EOSINOPHIL NFR BLD AUTO: 2.2 %
EOSINOPHILS ABSOLUTE: 0.2 THOU/MM3 (ref 0–0.4)
ERYTHROCYTE [DISTWIDTH] IN BLOOD BY AUTOMATED COUNT: 19.4 % (ref 11.5–14.5)
GFR SERPL CREATININE-BSD FRML MDRD: 45 ML/MIN/1.73M2
GLUCOSE BLD STRIP.AUTO-MCNC: 63 MG/DL (ref 70–108)
GLUCOSE BLD STRIP.AUTO-MCNC: 78 MG/DL (ref 70–108)
GLUCOSE SERPL-MCNC: 163 MG/DL (ref 70–108)
GLUCOSE UR QL STRIP.AUTO: 500 MG/DL
HCT VFR BLD AUTO: 38.6 % (ref 37–47)
HGB BLD-MCNC: 11 GM/DL (ref 12–16)
HGB UR QL STRIP.AUTO: NEGATIVE
HYPOCHROMIA BLD QL SMEAR: PRESENT
IMM GRANULOCYTES # BLD AUTO: 0.03 THOU/MM3 (ref 0–0.07)
IMM GRANULOCYTES NFR BLD AUTO: 0.3 %
INR PPP: 0.95 (ref 0.85–1.13)
KETONES UR QL STRIP.AUTO: NEGATIVE
LYMPHOCYTES ABSOLUTE: 1.1 THOU/MM3 (ref 1–4.8)
LYMPHOCYTES NFR BLD AUTO: 13 %
MCH RBC QN AUTO: 25.9 PG (ref 26–33)
MCHC RBC AUTO-ENTMCNC: 28.5 GM/DL (ref 32.2–35.5)
MCV RBC AUTO: 90.8 FL (ref 81–99)
MONOCYTES ABSOLUTE: 0.6 THOU/MM3 (ref 0.4–1.3)
MONOCYTES NFR BLD AUTO: 7 %
NEUTROPHILS NFR BLD AUTO: 77 %
NITRITE UR QL STRIP: NEGATIVE
NRBC BLD AUTO-RTO: 0 /100 WBC
NT-PROBNP SERPL IA-MCNC: ABNORMAL PG/ML (ref 0–449)
PH UR STRIP.AUTO: 5.5 [PH] (ref 5–9)
PLATELET # BLD AUTO: 330 THOU/MM3 (ref 130–400)
PMV BLD AUTO: 9.5 FL (ref 9.4–12.4)
POTASSIUM SERPL-SCNC: 4.9 MEQ/L (ref 3.5–5.2)
PROT SERPL-MCNC: 6.9 G/DL (ref 6.1–8)
PROT UR STRIP.AUTO-MCNC: NEGATIVE MG/DL
RBC # BLD AUTO: 4.25 MILL/MM3 (ref 4.2–5.4)
SEGMENTED NEUTROPHILS ABSOLUTE COUNT: 6.7 THOU/MM3 (ref 1.8–7.7)
SODIUM SERPL-SCNC: 138 MEQ/L (ref 135–145)
SP GR UR REFRACT.AUTO: 1.02 (ref 1–1.03)
TROPONIN, HIGH SENSITIVITY: 26 NG/L (ref 0–12)
UROBILINOGEN, URINE: 0.2 EU/DL (ref 0–1)
WBC # BLD AUTO: 8.7 THOU/MM3 (ref 4.8–10.8)
WBC #/AREA URNS HPF: NEGATIVE /[HPF]

## 2023-08-30 PROCEDURE — 96366 THER/PROPH/DIAG IV INF ADDON: CPT

## 2023-08-30 PROCEDURE — 1200000003 HC TELEMETRY R&B

## 2023-08-30 PROCEDURE — 82948 REAGENT STRIP/BLOOD GLUCOSE: CPT

## 2023-08-30 PROCEDURE — 85730 THROMBOPLASTIN TIME PARTIAL: CPT

## 2023-08-30 PROCEDURE — 93010 ELECTROCARDIOGRAM REPORT: CPT | Performed by: NUCLEAR MEDICINE

## 2023-08-30 PROCEDURE — 93005 ELECTROCARDIOGRAM TRACING: CPT | Performed by: EMERGENCY MEDICINE

## 2023-08-30 PROCEDURE — 99285 EMERGENCY DEPT VISIT HI MDM: CPT

## 2023-08-30 PROCEDURE — 71046 X-RAY EXAM CHEST 2 VIEWS: CPT

## 2023-08-30 PROCEDURE — 84484 ASSAY OF TROPONIN QUANT: CPT

## 2023-08-30 PROCEDURE — 96365 THER/PROPH/DIAG IV INF INIT: CPT

## 2023-08-30 PROCEDURE — 83880 ASSAY OF NATRIURETIC PEPTIDE: CPT

## 2023-08-30 PROCEDURE — 82248 BILIRUBIN DIRECT: CPT

## 2023-08-30 PROCEDURE — 80053 COMPREHEN METABOLIC PANEL: CPT

## 2023-08-30 PROCEDURE — 2500000003 HC RX 250 WO HCPCS: Performed by: EMERGENCY MEDICINE

## 2023-08-30 PROCEDURE — 36415 COLL VENOUS BLD VENIPUNCTURE: CPT

## 2023-08-30 PROCEDURE — 85610 PROTHROMBIN TIME: CPT

## 2023-08-30 PROCEDURE — 6370000000 HC RX 637 (ALT 250 FOR IP)

## 2023-08-30 PROCEDURE — 2580000003 HC RX 258: Performed by: EMERGENCY MEDICINE

## 2023-08-30 PROCEDURE — 99222 1ST HOSP IP/OBS MODERATE 55: CPT | Performed by: INTERNAL MEDICINE

## 2023-08-30 PROCEDURE — 81003 URINALYSIS AUTO W/O SCOPE: CPT

## 2023-08-30 PROCEDURE — 85025 COMPLETE CBC W/AUTO DIFF WBC: CPT

## 2023-08-30 RX ORDER — ONDANSETRON 2 MG/ML
4 INJECTION INTRAMUSCULAR; INTRAVENOUS EVERY 6 HOURS PRN
Status: DISCONTINUED | OUTPATIENT
Start: 2023-08-30 | End: 2023-09-02 | Stop reason: HOSPADM

## 2023-08-30 RX ORDER — SODIUM CHLORIDE 9 MG/ML
INJECTION, SOLUTION INTRAVENOUS PRN
Status: DISCONTINUED | OUTPATIENT
Start: 2023-08-30 | End: 2023-09-02 | Stop reason: HOSPADM

## 2023-08-30 RX ORDER — ONDANSETRON 4 MG/1
4 TABLET, ORALLY DISINTEGRATING ORAL EVERY 8 HOURS PRN
Status: DISCONTINUED | OUTPATIENT
Start: 2023-08-30 | End: 2023-09-02 | Stop reason: HOSPADM

## 2023-08-30 RX ORDER — INSULIN LISPRO 100 [IU]/ML
0-4 INJECTION, SOLUTION INTRAVENOUS; SUBCUTANEOUS NIGHTLY
Status: DISCONTINUED | OUTPATIENT
Start: 2023-08-30 | End: 2023-09-02 | Stop reason: HOSPADM

## 2023-08-30 RX ORDER — DEXTROSE MONOHYDRATE 100 MG/ML
INJECTION, SOLUTION INTRAVENOUS CONTINUOUS PRN
Status: DISCONTINUED | OUTPATIENT
Start: 2023-08-30 | End: 2023-09-02 | Stop reason: HOSPADM

## 2023-08-30 RX ORDER — ENOXAPARIN SODIUM 100 MG/ML
40 INJECTION SUBCUTANEOUS DAILY
Status: DISCONTINUED | OUTPATIENT
Start: 2023-08-31 | End: 2023-09-01

## 2023-08-30 RX ORDER — MAGNESIUM SULFATE IN WATER 40 MG/ML
2000 INJECTION, SOLUTION INTRAVENOUS PRN
Status: DISCONTINUED | OUTPATIENT
Start: 2023-08-30 | End: 2023-09-02 | Stop reason: HOSPADM

## 2023-08-30 RX ORDER — ATORVASTATIN CALCIUM 40 MG/1
40 TABLET, FILM COATED ORAL DAILY
Status: DISCONTINUED | OUTPATIENT
Start: 2023-08-31 | End: 2023-09-02 | Stop reason: HOSPADM

## 2023-08-30 RX ORDER — ACETAMINOPHEN 325 MG/1
650 TABLET ORAL EVERY 6 HOURS PRN
Status: DISCONTINUED | OUTPATIENT
Start: 2023-08-30 | End: 2023-09-02 | Stop reason: HOSPADM

## 2023-08-30 RX ORDER — ACETAMINOPHEN 325 MG/1
650 TABLET ORAL ONCE
Status: COMPLETED | OUTPATIENT
Start: 2023-08-31 | End: 2023-08-30

## 2023-08-30 RX ORDER — METOPROLOL SUCCINATE 50 MG/1
50 TABLET, EXTENDED RELEASE ORAL DAILY
Status: DISCONTINUED | OUTPATIENT
Start: 2023-08-31 | End: 2023-09-02 | Stop reason: HOSPADM

## 2023-08-30 RX ORDER — SODIUM CHLORIDE 0.9 % (FLUSH) 0.9 %
5-40 SYRINGE (ML) INJECTION EVERY 12 HOURS SCHEDULED
Status: DISCONTINUED | OUTPATIENT
Start: 2023-08-30 | End: 2023-09-02 | Stop reason: HOSPADM

## 2023-08-30 RX ORDER — SODIUM CHLORIDE 0.9 % (FLUSH) 0.9 %
10 SYRINGE (ML) INJECTION PRN
Status: DISCONTINUED | OUTPATIENT
Start: 2023-08-30 | End: 2023-09-02 | Stop reason: HOSPADM

## 2023-08-30 RX ORDER — POTASSIUM CHLORIDE 20 MEQ/1
40 TABLET, EXTENDED RELEASE ORAL PRN
Status: DISCONTINUED | OUTPATIENT
Start: 2023-08-30 | End: 2023-09-02 | Stop reason: HOSPADM

## 2023-08-30 RX ORDER — POTASSIUM CHLORIDE 7.45 MG/ML
10 INJECTION INTRAVENOUS PRN
Status: DISCONTINUED | OUTPATIENT
Start: 2023-08-30 | End: 2023-09-02 | Stop reason: HOSPADM

## 2023-08-30 RX ORDER — POLYETHYLENE GLYCOL 3350 17 G/17G
17 POWDER, FOR SOLUTION ORAL DAILY PRN
Status: DISCONTINUED | OUTPATIENT
Start: 2023-08-30 | End: 2023-09-02 | Stop reason: HOSPADM

## 2023-08-30 RX ORDER — INSULIN LISPRO 100 [IU]/ML
0-8 INJECTION, SOLUTION INTRAVENOUS; SUBCUTANEOUS
Status: DISCONTINUED | OUTPATIENT
Start: 2023-08-31 | End: 2023-09-02 | Stop reason: HOSPADM

## 2023-08-30 RX ORDER — DILTIAZEM HYDROCHLORIDE 5 MG/ML
10 INJECTION INTRAVENOUS ONCE
Status: COMPLETED | OUTPATIENT
Start: 2023-08-30 | End: 2023-08-30

## 2023-08-30 RX ADMIN — ACETAMINOPHEN 650 MG: 325 TABLET ORAL at 23:58

## 2023-08-30 RX ADMIN — DILTIAZEM HYDROCHLORIDE 5 MG/HR: 5 INJECTION INTRAVENOUS at 17:21

## 2023-08-30 RX ADMIN — DILTIAZEM HYDROCHLORIDE 10 MG: 5 INJECTION INTRAVENOUS at 17:20

## 2023-08-30 ASSESSMENT — PAIN - FUNCTIONAL ASSESSMENT: PAIN_FUNCTIONAL_ASSESSMENT: 0-10

## 2023-08-30 ASSESSMENT — PAIN SCALES - GENERAL
PAINLEVEL_OUTOF10: 8
PAINLEVEL_OUTOF10: 0
PAINLEVEL_OUTOF10: 7
PAINLEVEL_OUTOF10: 4

## 2023-08-30 ASSESSMENT — PAIN DESCRIPTION - LOCATION: LOCATION: CHEST

## 2023-08-30 NOTE — ED NOTES
Patient to ED for increased SOB and generalized weakness. Patient reports she has fallen multiple times over the past several days. EKG shows Afib RVR.  Patient alert and oriented VSS     Ashley Matthews RN  08/30/23 1671

## 2023-08-30 NOTE — ED NOTES
ED to inpatient nurses report    Chief Complaint   Patient presents with    Shortness of Breath    Atrial Fibrillation      Present to ED from home  LOC: alert and orientated to name, place, date  Vital signs   Vitals:    08/30/23 1623 08/30/23 1629 08/30/23 1719 08/30/23 1753   BP: (!) 133/108  (!) 114/94    Pulse: (!) 126 (!) 122 (!) 106 80   Resp: 20 24 20    Temp:       SpO2: 90% 92% 97%    Weight:       Height:          Oxygen Baseline room air    Current needs required none Bipap/Cpap No  LDAs:   Peripheral IV 08/30/23 Left Forearm (Active)     Mobility: Requires assistance * 1  Pending ED orders: none  Present condition: stable      C-SSRS Risk of Suicide: No Risk  Swallow Screening  Pass  Preferred Language: Jeffrey     Electronically signed by Rui Anne) JOLIE Jeffrey RN on 8/30/2023 at [address] [facility]) JOLIE Jeffrey RN  08/30/23 2493

## 2023-08-30 NOTE — H&P
infarction w/encephalomalacia / gliosis. Calcified atherosclerosis in [place] noted. Severe stenosis of right brachiocephalic artery: Noted on CTA H/N 2021- from its origin through the bifurcation with central thrombus, reconstitution of the right subclavian and right common carotid artery at the bifurcation of the brachiocephalic artery + Additional multifocal areas of atherosclerotic change involving the aortic arch vessels and left greater than right carotid system (50% narrowing)  Depakote as home medication - last Neurology OV? Last fill 8/15/23  Will resume on 8/31  Recent falls with h/o multiple traumatic falls: As mentioned above, h/o falls, SDH, etc... has not had imaging of head in quite some time. No FNDs, but does mention overall fatigue / slowing. Recent labs including electrolytes / vits wnl. Non-con CT H tomorrow to further evaluate predilection to bleed   Non-obstructive CAD: Per Chart review, noted from [facility] in [facility]. No signs of ischemia and denies CP. Trops mildly elevated, but lower and trending down since las ED visit. C/w ASA / Lipitor (starting 8/31)  Significant PVD w/chronic edema/stasis: b/l LE edema w/signs of venous stasis. Chronic issue, on PO Bumex as OP. Documented long hx. C/w Bumex on 8/31 due to taking on day of admission  CKD Stage IIIa, no SALVADOR: eGFR has been 45 and less over the last couple years per review. Cr 0.8-1 baseline range. Cr 1.2 on arrival.   Renally dose as appropriate + avoid nephrotoxins   IDDM2, controlled: Insulin listed on home medications + Jardiance 25mg. Last A1c reasonable. Per records pt takes 40U NPH at bedtime and 28U TID w/meals.  on arrival, pt not likely to eat since arrival  MD-SSI + Hypoglycemia precautions  Will resume home Insulin regimen in AM  Hypothyroidism on Synthroid: Controlled, last TSH/fT4 (8/28) 2.61/1.3  C/w Synthroid 50mcg   H/o DVT/PE: S/p IVC filter (2007), no complaints that lead to VTE concern at this time.  No sounds normal. No stridor. No respiratory distress. No wheezes. No rales. Patient exhibits no tenderness. Abdominal: Soft. Patient exhibits no distension. No tenderness. Bowel sounds present. Musculoskeletal: Normal range of motion. Extremities: Patient exhibits no edema and no tenderness. Lymphadenopathy: No cervical adenopathy. Neurological: Patient is alert and oriented to person, place, and time. Skin: Skin is warm and dry. Patient is not diaphoretic. No rashes or lesions. Psychiatric: Patient has a normal mood and affect.  Patient behavior is normal.     Electronically signed by Halie Magana M.D. on 8/30/2023 at 6:14 PM

## 2023-08-30 NOTE — ED NOTES
Report received from Janes, [address] [place] [address]. Inpatient provider at the bedside assessing patient. Patient resting in bed. Respirations easy and unlabored. No distress noted. Call light within reach. Family at the bedside.      Kai Mays RN  08/30/23 3031

## 2023-08-30 NOTE — ED PROVIDER NOTES
pneumonia    Initial plan:  Large-bore IV  Rate controlled with IV Cardizem drip  Basic labs  Chest x-ray  Plan to admit    Summary    Patient has A-fib with RVR, hemodynamically stable  Rate control with Cardizem drip started. Labs are reviewed which are reassuring at her baselines. Hemodynamic stable in the ED. D/w and admitted to hospital medicine service. Vitals:    08/30/23 1629 08/30/23 1719 08/30/23 1753 08/30/23 1920   BP:  (!) 114/94  125/85   Pulse: (!) 122 (!) 106 80 85   Resp: 24 20  18   Temp:       SpO2: 92% 97%  93%   Weight:       Height:           1) Number and Complexity of Problems        Problem List This Visit:   Shortness of Breath and Atrial Fibrillation      Pertinent Comorbid Conditions:    See HPI, PMH and PSH    2)  Data Reviewed (none if left blank)    External Documentation Reviewed:   Relevant record in care everywhere is reviewed. Previous patient encounter documents & history available on EMR was reviewed:   Yes (for relevant previous encounters)    See Formal Diagnostic Results above for the lab and radiology tests and orders. 3)  Treatment and Disposition    ED Reassessment:    Stable ED stay    Case discussed with consulting clinician:    Hospital medicine    Shared Decision-Making:   Treatment plan and disposition discussed with the patient/family, questions answered     Code Status:    Reviewed with patient and/or family as   Full code    ED Medications administered this visit:  (None if blank)  Medications   dilTIAZem injection 10 mg (10 mg IntraVENous Given 8/30/23 1720)     Followed by   dilTIAZem 125 mg in sodium chloride 0.9 % 125 mL infusion (5 mg/hr IntraVENous New Bag 8/30/23 1721)       PROCEDURES:   None    CRITICAL CARE:   CRITICAL CARE: There was a high probability of clinically significant/life threatening deterioration in this patient's condition of atrial fibrillation with RVR requiring Cardizem drip which required my urgent intervention.   I personally spent at least 30 minutes of time attending to this patient's critical care needs separate from teaching or billable procedures. This time includes bedside evaluation and management, review of labs and imaging, review of the chart for written updates and recommendations, documentation, and communication with other services on the case. This patient requires complex, high-level decision-making to prevent deterioration or morbid sequelae of ongoing disease. FINAL IMPRESSION      1. Atrial fibrillation with rapid ventricular response ([address])          DISPOSITION/PLAN   DISPOSITION Admitted 08/30/2023 07:00:48 PM      OUTPATIENT FOLLOW UP THE PATIENT:  No follow-up provider specified.   DISCHARGE MEDICATIONS:(None if blank)  New Prescriptions    No medications on file         MD Guilherme De La O MD  08/30/23 7876

## 2023-08-31 LAB
ALBUMIN SERPL BCG-MCNC: 3.4 G/DL (ref 3.5–5.1)
ALP SERPL-CCNC: 98 U/L (ref 38–126)
ALT SERPL W/O P-5'-P-CCNC: 12 U/L (ref 11–66)
ANION GAP SERPL CALC-SCNC: 13 MEQ/L (ref 8–16)
ANISOCYTOSIS BLD QL SMEAR: PRESENT
AST SERPL-CCNC: 11 U/L (ref 5–40)
BASOPHILS ABSOLUTE: 0 THOU/MM3 (ref 0–0.1)
BASOPHILS NFR BLD AUTO: 0.5 %
BILIRUB SERPL-MCNC: 0.5 MG/DL (ref 0.3–1.2)
BUN SERPL-MCNC: 37 MG/DL (ref 7–22)
CALCIUM SERPL-MCNC: 9.4 MG/DL (ref 8.5–10.5)
CHLORIDE SERPL-SCNC: 106 MEQ/L (ref 98–111)
CO2 SERPL-SCNC: 25 MEQ/L (ref 23–33)
CREAT SERPL-MCNC: 1.4 MG/DL (ref 0.4–1.2)
DEPRECATED RDW RBC AUTO: 65.1 FL (ref 35–45)
EKG ATRIAL RATE: 49 BPM
EKG P AXIS: 65 DEGREES
EKG P-R INTERVAL: 178 MS
EKG Q-T INTERVAL: 452 MS
EKG QRS DURATION: 80 MS
EKG QTC CALCULATION (BAZETT): 408 MS
EKG R AXIS: 43 DEGREES
EKG T AXIS: 62 DEGREES
EKG VENTRICULAR RATE: 49 BPM
EOSINOPHIL NFR BLD AUTO: 3.3 %
EOSINOPHILS ABSOLUTE: 0.3 THOU/MM3 (ref 0–0.4)
ERYTHROCYTE [DISTWIDTH] IN BLOOD BY AUTOMATED COUNT: 19.6 % (ref 11.5–14.5)
GFR SERPL CREATININE-BSD FRML MDRD: 38 ML/MIN/1.73M2
GLUCOSE BLD STRIP.AUTO-MCNC: 114 MG/DL (ref 70–108)
GLUCOSE BLD STRIP.AUTO-MCNC: 128 MG/DL (ref 70–108)
GLUCOSE BLD STRIP.AUTO-MCNC: 173 MG/DL (ref 70–108)
GLUCOSE BLD STRIP.AUTO-MCNC: 205 MG/DL (ref 70–108)
GLUCOSE BLD STRIP.AUTO-MCNC: 231 MG/DL (ref 70–108)
GLUCOSE SERPL-MCNC: 118 MG/DL (ref 70–108)
HCT VFR BLD AUTO: 36.9 % (ref 37–47)
HGB BLD-MCNC: 10.3 GM/DL (ref 12–16)
HYPOCHROMIA BLD QL SMEAR: PRESENT
IMM GRANULOCYTES # BLD AUTO: 0.02 THOU/MM3 (ref 0–0.07)
IMM GRANULOCYTES NFR BLD AUTO: 0.3 %
LV EF: 50 %
LVEF MODALITY: NORMAL
LYMPHOCYTES ABSOLUTE: 1.4 THOU/MM3 (ref 1–4.8)
LYMPHOCYTES NFR BLD AUTO: 18 %
MCH RBC QN AUTO: 25.7 PG (ref 26–33)
MCHC RBC AUTO-ENTMCNC: 27.9 GM/DL (ref 32.2–35.5)
MCV RBC AUTO: 92 FL (ref 81–99)
MONOCYTES ABSOLUTE: 0.7 THOU/MM3 (ref 0.4–1.3)
MONOCYTES NFR BLD AUTO: 8.7 %
NEUTROPHILS NFR BLD AUTO: 69.2 %
NRBC BLD AUTO-RTO: 0 /100 WBC
PLATELET # BLD AUTO: 321 THOU/MM3 (ref 130–400)
PMV BLD AUTO: 9.9 FL (ref 9.4–12.4)
POTASSIUM SERPL-SCNC: 5.2 MEQ/L (ref 3.5–5.2)
PROT SERPL-MCNC: 6.2 G/DL (ref 6.1–8)
RBC # BLD AUTO: 4.01 MILL/MM3 (ref 4.2–5.4)
SCAN OF BLOOD SMEAR: NORMAL
SEGMENTED NEUTROPHILS ABSOLUTE COUNT: 5.3 THOU/MM3 (ref 1.8–7.7)
SODIUM SERPL-SCNC: 144 MEQ/L (ref 135–145)
WBC # BLD AUTO: 7.6 THOU/MM3 (ref 4.8–10.8)

## 2023-08-31 PROCEDURE — 97530 THERAPEUTIC ACTIVITIES: CPT

## 2023-08-31 PROCEDURE — 85025 COMPLETE CBC W/AUTO DIFF WBC: CPT

## 2023-08-31 PROCEDURE — 1200000003 HC TELEMETRY R&B

## 2023-08-31 PROCEDURE — 93005 ELECTROCARDIOGRAM TRACING: CPT | Performed by: INTERNAL MEDICINE

## 2023-08-31 PROCEDURE — 6370000000 HC RX 637 (ALT 250 FOR IP): Performed by: STUDENT IN AN ORGANIZED HEALTH CARE EDUCATION/TRAINING PROGRAM

## 2023-08-31 PROCEDURE — 6360000002 HC RX W HCPCS: Performed by: STUDENT IN AN ORGANIZED HEALTH CARE EDUCATION/TRAINING PROGRAM

## 2023-08-31 PROCEDURE — 93307 TTE W/O DOPPLER COMPLETE: CPT

## 2023-08-31 PROCEDURE — 80053 COMPREHEN METABOLIC PANEL: CPT

## 2023-08-31 PROCEDURE — 99232 SBSQ HOSP IP/OBS MODERATE 35: CPT | Performed by: INTERNAL MEDICINE

## 2023-08-31 PROCEDURE — 6370000000 HC RX 637 (ALT 250 FOR IP): Performed by: INTERNAL MEDICINE

## 2023-08-31 PROCEDURE — 36415 COLL VENOUS BLD VENIPUNCTURE: CPT

## 2023-08-31 PROCEDURE — 97166 OT EVAL MOD COMPLEX 45 MIN: CPT

## 2023-08-31 PROCEDURE — 2580000003 HC RX 258: Performed by: STUDENT IN AN ORGANIZED HEALTH CARE EDUCATION/TRAINING PROGRAM

## 2023-08-31 PROCEDURE — 82948 REAGENT STRIP/BLOOD GLUCOSE: CPT

## 2023-08-31 PROCEDURE — 93306 TTE W/DOPPLER COMPLETE: CPT

## 2023-08-31 PROCEDURE — 93010 ELECTROCARDIOGRAM REPORT: CPT | Performed by: NUCLEAR MEDICINE

## 2023-08-31 PROCEDURE — 97535 SELF CARE MNGMENT TRAINING: CPT

## 2023-08-31 RX ADMIN — ENOXAPARIN SODIUM 40 MG: 100 INJECTION SUBCUTANEOUS at 08:44

## 2023-08-31 RX ADMIN — SODIUM CHLORIDE, PRESERVATIVE FREE 10 ML: 5 INJECTION INTRAVENOUS at 08:44

## 2023-08-31 RX ADMIN — SODIUM CHLORIDE, PRESERVATIVE FREE 10 ML: 5 INJECTION INTRAVENOUS at 21:46

## 2023-08-31 RX ADMIN — METOPROLOL TARTRATE 25 MG: 25 TABLET, FILM COATED ORAL at 21:45

## 2023-08-31 RX ADMIN — INSULIN LISPRO 2 UNITS: 100 INJECTION, SOLUTION INTRAVENOUS; SUBCUTANEOUS at 12:44

## 2023-08-31 RX ADMIN — ATORVASTATIN CALCIUM 40 MG: 40 TABLET, FILM COATED ORAL at 08:44

## 2023-08-31 ASSESSMENT — PAIN SCALES - GENERAL: PAINLEVEL_OUTOF10: 0

## 2023-08-31 NOTE — CARE COORDINATION
DISCHARGE PLANNING EVALUATION  8/31/23, 1:41 PM EDT    Reason for Referral: Current [facility] for RN, PT and OT services   Mental Status: Answered questions appropriately   Decision Making: Independent with help from family   Family/Social/Home Environment: Patient is at home alone, however has a daughter nearby that helps with cooking, cleaning and transportation. Sunita Jimenez has a son [facility] who lives in [place] that is on his way into town. Current Services including food security, transportation and housekeeping:  Current [facility] for RN, PT and OT services. Current Equipment: Fredda Cunas, electric wheelchair, lift chair and shower chair   Payment Source:Medicare  Concerns or Barriers to Discharge: None  Post-acute [facility]) provider list was provided to patient. Patient was informed of their freedom to choose [facility] provider. Discussed and offered to show the patient the relevant [facility] Providers quality and resource use measures on Medicare Compare web site via computer based on patient's goals of care and treatment preferences. Questions regarding selection process were answered. Teach Back Method used with [facility] regarding care plan and discharge planning  Patient verbalized understanding of the plan of care and contribute to goal setting. Patient goals, treatment preferences and discharge plan: Return to home alone with support from nearby daughter. Plans is to return with current [facility] for RN, PT and OT services.      Electronically signed by JANES Morgan on 8/31/2023 at 1:41 PM

## 2023-08-31 NOTE — PLAN OF CARE
Problem: Discharge Planning  Goal: Discharge to home or other facility with appropriate resources  8/31/2023 1341 by JANES Carlos  Outcome: Progressing       Consult received. Please see SW note dated 8/31.

## 2023-08-31 NOTE — PALLIATIVE CARE
Initial Evaluation        Patient:   Shu Crowley  YOB: 1940  Age:  82 y.o. Room:  [facility]33/033-A  MRN:  [id number]   Acct: [de-identified]    Date of Admission:  8/30/2023  3:26 PM  Date of Service:  8/31/2023  Completed By:  Suzi Meredith RN                 Reason for Palliative Care Evaluation:-               [x] Code Status Discussion              [] Goals of Care              [] Pain/Symptom Management               [] Emotional Support              [] Other:                            Advance Directives:-    [] [address] [facility] DNR Form  [] Living Will  [x] Medical POA              Current Code Status:-     [] Full Resuscitation  [] DNR-Comfort Care-Arrest  [] DNR-Comfort Care       [x] Limited Resuscitation             [x] No CPR            [x] No shock            [x] No ET intubation/reintubation            [x] No resuscitative medications            [] Other limitation:               Palliative Performance Status:-      [] 100%  Full ambulation; normal activity and work; no evidence of disease; able to do own self care; normal intake; fully conscious     [] 90%   Full ambulation; normal activity and work; some evidence of disease; able to do own self care; normal intake; fully conscious    [] 80%   Full ambulation; normal activity with effort; some evidence of disease; able to do own self care; normal or reduced intake; fully conscious    [] 70%  Ambulation reduced; unable to perform normal job/work; significant  disease; able to do own self care; normal or reduced intake; fully conscious    [] 60%  Ambulation reduced; Significant disease;Can't do hobbies/housework; intake normal or reduced; occasional assist; LOC full/confusion    [x] 50%  Mainly sit/lie; Extensive disease; Can't do any work; Considerable assist; intake normal or reduced; LOC full/confusion    [] 40%  Mainly in bed; Extensive disease; Mainly assist; intake normal or reduced; LOC full/confusion     [] 30%  Bed Bound;  Extensive disease; Total care; intake reduced; LOC full/confusion    [] 20%  Bed Bound; Extensive disease; Total care; intake minimal; Drowsy/coma    [] 10%  Bed Bound; Extensive disease; Total care; Mouth care only; Drowsy/coma               Goals of care evaluation:-          Goals of care discussed with:  [] Patient independently  [] Patient and Family  [] Family or Healthcare DPOA independently  [x] Unable to discuss with patient, family/DPOA not present      History:-    Patient with history of Afib (not on [facility] due to history of falls/subdural) CVA, CHF, morbid obesity, HLD, HTN, AMANDEEP, DM, CKD, DVT/PE with IVC filter. Patient is admitted with shortness of breath/tachycardia. Family/Patient Discussion:-    Patient is currently up in the chair. Eyes are closed and no family is present. Discussed with primary RN and  and patient's son is coming from out of state and he should be arriving tonight or tomorrow. Primary RN indicates that it would be best to speak with patient/son together. Plan/Follow-Up:-    Palliative care will follow up tomorrow when son is here.  will attempt to call palliative care tomorrow when the son is present.            Electronically signed by Lesia Saunders RN on 8/31/2023 at 2:37 PM           Palliative Care Office: [phone]

## 2023-08-31 NOTE — PLAN OF CARE
Problem: Discharge Planning  Goal: Discharge to home or other facility with appropriate resources  Outcome: Progressing  Flowsheets (Taken 8/30/2023 2113)  Discharge to home or other facility with appropriate resources:   Identify barriers to discharge with patient and caregiver   Arrange for needed discharge resources and transportation as appropriate   Identify discharge learning needs (meds, wound care, etc)   Refer to discharge planning if patient needs post-hospital services based on physician order or complex needs related to functional status, cognitive ability or social support system     Problem: Pain  Goal: Verbalizes/displays adequate comfort level or baseline comfort level  Outcome: Progressing  Flowsheets (Taken 8/30/2023 2113)  Verbalizes/displays adequate comfort level or baseline comfort level:   Encourage patient to monitor pain and request assistance   Assess pain using appropriate pain scale   Administer analgesics based on type and severity of pain and evaluate response   Implement non-pharmacological measures as appropriate and evaluate response     Problem: Safety - Adult  Goal: Free from fall injury  Outcome: Progressing     Problem: Cardiovascular - Adult  Goal: Maintains optimal cardiac output and hemodynamic stability  Outcome: Progressing  Flowsheets (Taken 8/31/2023 0520)  Maintains optimal cardiac output and hemodynamic stability:   Monitor blood pressure and heart rate   Assess for signs of decreased cardiac output   Monitor urine output and notify Licensed Independent Practitioner for values outside of normal range  Goal: Absence of cardiac dysrhythmias or at baseline  Outcome: Progressing  Flowsheets (Taken 8/31/2023 0520)  Absence of cardiac dysrhythmias or at baseline:   Monitor cardiac rate and rhythm   Assess for signs of decreased cardiac output   Administer antiarrhythmia medication and electrolyte replacement as ordered

## 2023-08-31 NOTE — ED NOTES
Called and spoke to Humaira on 8AB prior to transport of this patient.       Nunu Gusman  08/30/23 2025

## 2023-08-31 NOTE — PROGRESS NOTES
[facility]  INPATIENT OCCUPATIONAL THERAPY  STR MED SURG 8B  EVALUATION    Time:   Time In:   Time Out: 913  Timed Code Treatment Minutes: 25 Minutes  Minutes: 37          Date: 2023  Patient Name: Jay Miranda,   Gender: female      MRN: [id number]  : 1940  (82 y.o.)  Referring Practitioner: Monisha Mckeon DO  Diagnosis: Atrial fibrillation with rapid ventricular response  Additional Pertinent Hx: Jay Miranda is a 82 y.o. female w/significant and complex PMHx who presented to [facility] ED c/o SOB and lethargy. Pt brought in from home via family member due to increasing SOB and tachycardia / palpitations. Pt recently seen in ED for similar episode w/quick resolution prior to d/c. According to previous visit note and pt, she has not been taking Toprol due to a mail / delivery issue. Denies CP, abdominal pain, HA, blurry vision, N/V/D, fever/chills. Restrictions/Precautions:  Restrictions/Precautions: Fall Risk, General Precautions    Subjective  Chart Reviewed: Yes, Orders, Progress Notes  Patient assessed for rehabilitation services?: Yes    Subjective: RN okayed session. Pt was resting in bed upon arrival, pleasant and agreeable to OT.     Pain: min c/o discomfort in neck    Vitals: Vitals not assessed per clinical judgement, see nursing flowsheet    Social/Functional History:  Lives With: Alone  Type of Home: House  Home Layout: One level  Home Access: Stairs to enter without rails  Entrance Stairs - Number of Steps: 1.5  Home Equipment: Fredda Jayden, 4 wheeled   Bathroom Shower/Tub:  (walk in tub)  Bathroom Toilet: Handicap height  Bathroom Accessibility: Accessible       ADL Assistance: [address] Independent  Homemaking Responsibilities: Yes  Ambulation Assistance: Independent  Transfer Assistance: Independent    Active : Yes          VISION:Corrected    HEARING:   Deaf in L ear    COGNITION: Decreased Problem Solving and Decreased Safety

## 2023-08-31 NOTE — ED NOTES
Patient assisted to bedside commode and returned to bed without difficulty at this time. Urine sample collected and sent to lab. Patient resting in bed. Respirations easy and unlabored. No distress noted. Call light within reach.       Bita Rueda RN  08/30/23 [id number]

## 2023-09-01 PROBLEM — Z71.89 GOALS OF CARE, COUNSELING/DISCUSSION: Status: ACTIVE | Noted: 2023-09-01

## 2023-09-01 PROBLEM — I49.5 TACHY-BRADY SYNDROME (HCC): Status: ACTIVE | Noted: 2023-09-01

## 2023-09-01 LAB
ANION GAP SERPL CALC-SCNC: 10 MEQ/L (ref 8–16)
BASOPHILS ABSOLUTE: 0.1 THOU/MM3 (ref 0–0.1)
BASOPHILS NFR BLD AUTO: 0.6 %
BUN SERPL-MCNC: 32 MG/DL (ref 7–22)
CALCIUM SERPL-MCNC: 9.5 MG/DL (ref 8.5–10.5)
CHLORIDE SERPL-SCNC: 104 MEQ/L (ref 98–111)
CO2 SERPL-SCNC: 26 MEQ/L (ref 23–33)
CREAT SERPL-MCNC: 1.1 MG/DL (ref 0.4–1.2)
DEPRECATED RDW RBC AUTO: 61.3 FL (ref 35–45)
EKG ATRIAL RATE: 56 BPM
EKG P AXIS: 40 DEGREES
EKG P-R INTERVAL: 182 MS
EKG Q-T INTERVAL: 408 MS
EKG QRS DURATION: 78 MS
EKG QTC CALCULATION (BAZETT): 393 MS
EKG R AXIS: 46 DEGREES
EKG T AXIS: 1 DEGREES
EKG VENTRICULAR RATE: 56 BPM
EOSINOPHIL NFR BLD AUTO: 2.4 %
EOSINOPHILS ABSOLUTE: 0.2 THOU/MM3 (ref 0–0.4)
ERYTHROCYTE [DISTWIDTH] IN BLOOD BY AUTOMATED COUNT: 19 % (ref 11.5–14.5)
GFR SERPL CREATININE-BSD FRML MDRD: 50 ML/MIN/1.73M2
GLUCOSE BLD STRIP.AUTO-MCNC: 130 MG/DL (ref 70–108)
GLUCOSE BLD STRIP.AUTO-MCNC: 180 MG/DL (ref 70–108)
GLUCOSE BLD STRIP.AUTO-MCNC: 182 MG/DL (ref 70–108)
GLUCOSE BLD STRIP.AUTO-MCNC: 192 MG/DL (ref 70–108)
GLUCOSE SERPL-MCNC: 149 MG/DL (ref 70–108)
HCT VFR BLD AUTO: 35.9 % (ref 37–47)
HGB BLD-MCNC: 10.2 GM/DL (ref 12–16)
HYPOCHROMIA BLD QL SMEAR: PRESENT
IMM GRANULOCYTES # BLD AUTO: 0.03 THOU/MM3 (ref 0–0.07)
IMM GRANULOCYTES NFR BLD AUTO: 0.3 %
LYMPHOCYTES ABSOLUTE: 0.8 THOU/MM3 (ref 1–4.8)
LYMPHOCYTES NFR BLD AUTO: 8.8 %
MCH RBC QN AUTO: 25.6 PG (ref 26–33)
MCHC RBC AUTO-ENTMCNC: 28.4 GM/DL (ref 32.2–35.5)
MCV RBC AUTO: 90 FL (ref 81–99)
MONOCYTES ABSOLUTE: 0.6 THOU/MM3 (ref 0.4–1.3)
MONOCYTES NFR BLD AUTO: 7.2 %
NEUTROPHILS NFR BLD AUTO: 80.7 %
NRBC BLD AUTO-RTO: 0 /100 WBC
PLATELET # BLD AUTO: 305 THOU/MM3 (ref 130–400)
PMV BLD AUTO: 9.4 FL (ref 9.4–12.4)
POTASSIUM SERPL-SCNC: 5.1 MEQ/L (ref 3.5–5.2)
RBC # BLD AUTO: 3.99 MILL/MM3 (ref 4.2–5.4)
SEGMENTED NEUTROPHILS ABSOLUTE COUNT: 7.1 THOU/MM3 (ref 1.8–7.7)
SODIUM SERPL-SCNC: 140 MEQ/L (ref 135–145)
TROPONIN, HIGH SENSITIVITY: 19 NG/L (ref 0–12)
WBC # BLD AUTO: 8.8 THOU/MM3 (ref 4.8–10.8)

## 2023-09-01 PROCEDURE — 97530 THERAPEUTIC ACTIVITIES: CPT

## 2023-09-01 PROCEDURE — 93010 ELECTROCARDIOGRAM REPORT: CPT | Performed by: NUCLEAR MEDICINE

## 2023-09-01 PROCEDURE — 1200000003 HC TELEMETRY R&B

## 2023-09-01 PROCEDURE — 85025 COMPLETE CBC W/AUTO DIFF WBC: CPT

## 2023-09-01 PROCEDURE — 2580000003 HC RX 258: Performed by: STUDENT IN AN ORGANIZED HEALTH CARE EDUCATION/TRAINING PROGRAM

## 2023-09-01 PROCEDURE — 36415 COLL VENOUS BLD VENIPUNCTURE: CPT

## 2023-09-01 PROCEDURE — 2500000003 HC RX 250 WO HCPCS: Performed by: INTERNAL MEDICINE

## 2023-09-01 PROCEDURE — 84484 ASSAY OF TROPONIN QUANT: CPT

## 2023-09-01 PROCEDURE — 6370000000 HC RX 637 (ALT 250 FOR IP): Performed by: STUDENT IN AN ORGANIZED HEALTH CARE EDUCATION/TRAINING PROGRAM

## 2023-09-01 PROCEDURE — 97535 SELF CARE MNGMENT TRAINING: CPT

## 2023-09-01 PROCEDURE — 93005 ELECTROCARDIOGRAM TRACING: CPT

## 2023-09-01 PROCEDURE — 80048 BASIC METABOLIC PNL TOTAL CA: CPT

## 2023-09-01 PROCEDURE — 6370000000 HC RX 637 (ALT 250 FOR IP): Performed by: INTERNAL MEDICINE

## 2023-09-01 PROCEDURE — 99233 SBSQ HOSP IP/OBS HIGH 50: CPT | Performed by: INTERNAL MEDICINE

## 2023-09-01 PROCEDURE — 82948 REAGENT STRIP/BLOOD GLUCOSE: CPT

## 2023-09-01 PROCEDURE — 6360000002 HC RX W HCPCS: Performed by: STUDENT IN AN ORGANIZED HEALTH CARE EDUCATION/TRAINING PROGRAM

## 2023-09-01 RX ORDER — BUMETANIDE 0.25 MG/ML
1 INJECTION INTRAMUSCULAR; INTRAVENOUS ONCE
Status: COMPLETED | OUTPATIENT
Start: 2023-09-01 | End: 2023-09-01

## 2023-09-01 RX ORDER — ENOXAPARIN SODIUM 100 MG/ML
30 INJECTION SUBCUTANEOUS 2 TIMES DAILY
Status: DISCONTINUED | OUTPATIENT
Start: 2023-09-02 | End: 2023-09-02 | Stop reason: HOSPADM

## 2023-09-01 RX ORDER — BUMETANIDE 1 MG/1
1 TABLET ORAL DAILY
Status: DISCONTINUED | OUTPATIENT
Start: 2023-09-02 | End: 2023-09-02 | Stop reason: HOSPADM

## 2023-09-01 RX ADMIN — ATORVASTATIN CALCIUM 40 MG: 40 TABLET, FILM COATED ORAL at 09:24

## 2023-09-01 RX ADMIN — SODIUM CHLORIDE, PRESERVATIVE FREE 10 ML: 5 INJECTION INTRAVENOUS at 09:25

## 2023-09-01 RX ADMIN — BUMETANIDE 1 MG: 0.25 INJECTION INTRAMUSCULAR; INTRAVENOUS at 16:58

## 2023-09-01 RX ADMIN — SODIUM CHLORIDE, PRESERVATIVE FREE 10 ML: 5 INJECTION INTRAVENOUS at 20:54

## 2023-09-01 RX ADMIN — METOPROLOL TARTRATE 25 MG: 25 TABLET, FILM COATED ORAL at 09:24

## 2023-09-01 RX ADMIN — ENOXAPARIN SODIUM 40 MG: 100 INJECTION SUBCUTANEOUS at 09:27

## 2023-09-01 RX ADMIN — METOPROLOL TARTRATE 25 MG: 25 TABLET, FILM COATED ORAL at 20:55

## 2023-09-01 ASSESSMENT — PAIN SCALES - GENERAL
PAINLEVEL_OUTOF10: 0

## 2023-09-01 NOTE — PALLIATIVE CARE
Follow Up / Progress Note        Patient:   Vamsi Langley  YOB: 1940  Age:  82 y.o. Room:  [facility]33/033-A  MRN:  [id number]         Family/Patient Discussion:  In room to speak with patient and patient's son. Patient resting in bed, son at bedside. When entering room patient states she \"feels awful\". Patient complaining of chest discomfort. Patient's son stating patient has significant stenosis and blockages. Has been evaluated at [facility] and was said to be too high risk for intervention. Discussed plan of care at this time. Patient's son discussed concern of \"early dementia\". Stated patient frequently forgets what time it is, morning or nigh. Forgets when patient's daughter has been over and brought groceries. Patient states she finds herself sleeping most of the day. Patient shared that about 2 weeks ago she had about 4 falls, 3 in one day. Discussed patient's safety of returning home alone. Son states they are not considering a nursing home at this time. Patient has multiple grab bars at home, lift recliner, walk in tub. Patient's daughter completes household chores, makes patient breakfast, gets groceries. Patient has been able to ambulate to bathroom with assist since admitted. Has [address] with therapy at home. Patient and son denied further questions at this time. Plan/Follow-Up:  Continue current treatment plan. Patient to be discharged with home health. Palliative will follow PRN, please call if further needs arise.           Electronically signed by Lolis Ayoub RN on 9/1/2023 at 12:36 PM             Palliative Care Office: [phone]

## 2023-09-01 NOTE — CARE COORDINATION
9/1/23, 3:05 PM EDT    DISCHARGE ON GOING [address] day: 2  Location: [facility] Reason for admit: Atrial fibrillation with rapid ventricular response ([address]) [I48.91]   Procedure:   8/30 CXR: No acute intrathoracic process. 2. Moderate stable cardiomegaly  8/31 Complete ECHO: EF 50%. Overall left ventricular function is normal. Moderately dilated left atrium. Myxomatotic degeneration of mitral valve. Calcification of the mitral valve noted. Decreased mitral valve mobility noted  Barriers to Discharge: Hospitalist and palliative care following. PT/OT. Daily weights. Telemetry. IS. Currently on 3L NC, room air baseline- nursing staff to wean as tolerated. PCP: Bri Nelson MD  Readmission Risk Score: 17.7%  Patient Goals/Plan/Treatment Preferences: Plan is for Lizbeth to return home alone with [facility] and current DME. Possible need for home oxygen and hospital bed? Patient/Family prefer [facility].     9/1/23, 3:05 PM EDT  Potential Weekend Discharge  Patient goals/plan/ treatment preferences discussed by  and . Patient goals/plan/ treatment preferences reviewed with patient/ family. Patient/ family verbalize understanding of discharge plan and are in agreement with goal/plan/treatment preferences. Understanding was demonstrated using the teach back method. AVS provided by RN at time of discharge, which includes all necessary medical information pertaining to the patients current course of illness, treatment, post-discharge goals of care, and treatment preferences. Services At/After Discharge: DME, Home Health, Aide services, Nursing service, OT, and PT Home alone with [address] [place] [address], PT/OT and aide services. Has DME. If needs home oxygen or hospital bed, patient and family prefer [address]. Post-acute [facility]) provider list was provided to patient. Patient was informed of their freedom to choose [facility] provider.  Discussed and offered to show the patient the relevant [facility] Providers quality and resource use measures on Medicare Compare web site via computer based on patient's goals of care and treatment preferences. Questions regarding selection process were answered.          IMM Letter  IMM Letter given to Patient/Family/Significant other/Guardian/POA/by[de-identified] Narendra Gray RN, CM  IMM Letter date given[de-identified] 09/01/23  IMM Letter time given[de-identified] 0900

## 2023-09-01 NOTE — PLAN OF CARE
Problem: Discharge Planning  Goal: Discharge to home or other facility with appropriate resources  9/1/2023 1143 by Juancho Young, RN  Outcome: Progressing  Flowsheets (Taken 8/31/2023 2200 by Jesse Stephen, RN)  Discharge to home or other facility with appropriate resources:   Identify barriers to discharge with patient and caregiver   Arrange for needed discharge resources and transportation as appropriate   Identify discharge learning needs (meds, wound care, etc)   Refer to discharge planning if patient needs post-hospital services based on physician order or complex needs related to functional status, cognitive ability or social support system  9/1/2023 1142 by Juancho Young, RN  Outcome: Ty Ulicesanile (Taken 8/31/2023 2200 by Jesse Stephen, RN)  Discharge to home or other facility with appropriate resources:   Identify barriers to discharge with patient and caregiver   Arrange for needed discharge resources and transportation as appropriate   Identify discharge learning needs (meds, wound care, etc)   Refer to discharge planning if patient needs post-hospital services based on physician order or complex needs related to functional status, cognitive ability or social support system  9/1/2023 0552 by Jesse Stephen, RN  Outcome: Progressing  Flowsheets (Taken 8/31/2023 2200)  Discharge to home or other facility with appropriate resources:   Identify barriers to discharge with patient and caregiver   Arrange for needed discharge resources and transportation as appropriate   Identify discharge learning needs (meds, wound care, etc)   Refer to discharge planning if patient needs post-hospital services based on physician order or complex needs related to functional status, cognitive ability or social support system     Problem: Pain  Goal: Verbalizes/displays adequate comfort level or baseline comfort level  9/1/2023 1143 by Juancho Young, KIRIT  Outcome: Ty Ulicesanile (Taken 8/30/2023 2113 by Regulo Dinero improved  9/1/2023 1143 by Negin De La Paz RN  Outcome: Progressing  Flowsheets (Taken 9/1/2023 1143)  Care Plan - Patient's Chronic Conditions and Co-Morbidity Symptoms are Monitored and Maintained or Improved:   Monitor and assess patient's chronic conditions and comorbid symptoms for stability, deterioration, or improvement   Collaborate with multidisciplinary team to address chronic and comorbid conditions and prevent exacerbation or deterioration   Update acute care plan with appropriate goals if chronic or comorbid symptoms are exacerbated and prevent overall improvement and discharge  9/1/2023 1142 by Negin De La Paz RN  Outcome: Jun Sharif (Taken 8/31/2023 2200 by Randee North RN)  Care Plan - Patient's Chronic Conditions and Co-Morbidity Symptoms are Monitored and Maintained or Improved:   Monitor and assess patient's chronic conditions and comorbid symptoms for stability, deterioration, or improvement   Collaborate with multidisciplinary team to address chronic and comorbid conditions and prevent exacerbation or deterioration  9/1/2023 0552 by Randee North RN  Outcome: Progressing  Flowsheets (Taken 8/31/2023 2200)  Care Plan - Patient's Chronic Conditions and Co-Morbidity Symptoms are Monitored and Maintained or Improved:   Monitor and assess patient's chronic conditions and comorbid symptoms for stability, deterioration, or improvement   Collaborate with multidisciplinary team to address chronic and comorbid conditions and prevent exacerbation or deterioration     Problem: Neurosensory - Adult  Goal: Achieves maximal functionality and self care  Outcome: Progressing  Flowsheets (Taken 9/1/2023 1143)  Achieves maximal functionality and self care:   Monitor swallowing and airway patency with patient fatigue and changes in neurological status   Encourage and assist patient to increase activity and self care with guidance from physical therapy/occupational therapy   Encourage visually impaired,

## 2023-09-01 NOTE — PROGRESS NOTES
Internal Medicine Resident Progress Note    Name: Enoch Fulton, female, : 1940, MRN: [id number]    PCP: Michael Crystal MD    Date of Admission: 2023  Date of Service: Pt seen/examined on 23      Assessment/Plan:  #SSS w/ tachy-xi: initially presented in Afib RVR, significant bradycardia after discontinuation of cardizem gtt. BB had been held due to HR in low 40s. Continue to monitor on telemetry. Lowest HR recorded overnight  noted to be 53. #Afib RVR, hx chronic Afib (not on [address]): MPQES1Nmdc 6, HAS-BLED 5. History of pAfib, on Toprol XL 50 mg daily at home but had issue with mail order issue at home. Not on [address] due to hemorraghic CVA (SDH s/p fall ). Initially on Cardizem gtt. Hx of multiple falls, traumatic intracranial bleed, hx bleeding documented. Long-term [address] held. Continue Lopressor 25 mg BID w/ parameters (substituted for home Toprol for easier titration)    #SOB, multifactorial: 2/2 afib RVR, AMANDEEP, recent fall w/ injury to ribs, chronic diastolic HF. Not requiring oxygen on admission. Not on oxygen at baseline. Required 2L NC overnight , increased to 4L. Now weaning down. Some component of anxiety contributing to symptoms. Continue to wean oxygen as tolerated. #Chronic diastolic CHF, not in exacerbation: TTTE 2018 EF 74%, TTE 2018 EF 55-60%. Outpatient regimen includes  BB / ACEi / Claudia Purdue / MRA / Bumex. TTE this admission EF 50%, moderately dilated LA, myxomatotic degeneration mitral valve. #Nonobstructive CAD: Noted from [facility] 2019. Troponin downtrending. On ASA/Lipitor. #Significant PVD w/ chronic edema/venous stasis: on PO bumex as outpatient. #CKD3a: Cr 0.8-1.2 baseline range. Cr 1.2 on arrival. Renally dose medications, avoid nephrotoxins. #IDDM2, controlled: Per chart review, pt takes NPH 40U at bedtime and 28U TID with meals. On Jardiance 25 mg. Continue medium dose SSI.      #Hx CVA, SDH after fall, multiple seizures:  for SDH, the upper or lower extremities. Cranial nerves:  grossly non-focal 2-12. Psychiatric: Alert and oriented, mild confusion during conversations  Capillary Refill: Brisk,< 3 seconds. Peripheral Pulses: +2 palpable, equal bilaterally. Labs:   Recent Labs     08/30/23  1551 08/31/23  0534 09/01/23  0554   WBC 8.7 7.6 8.8   HGB 11.0* 10.3* 10.2*   HCT 38.6 36.9* 35.9*    321 305     Recent Labs     08/30/23  1551 08/31/23  0534 09/01/23  0554    144 140   K 4.9 5.2 5.1    106 104   CO2 25 25 26   BUN 34* 37* 32*   CREATININE 1.2 1.4* 1.1   CALCIUM 9.6 9.4 9.5     Recent Labs     08/30/23  1551 08/31/23  0534   AST 20 11   ALT 16 12   BILIDIR <0.2  --    BILITOT 0.6 0.5   ALKPHOS 109 98     Recent Labs     08/30/23  1551   INR 0.95     No results for input(s): TROPONINT in the last 72 hours. No results for input(s): PROCAL in the last 72 hours. Lab Results   Component Value Date/Time    NITRU NEGATIVE 08/30/2023 08:20 PM    WBCUA 0-2 06/16/2016 09:00 PM    BACTERIA NONE 06/16/2016 09:00 PM    RBCUA 3-5 06/16/2016 09:00 PM    BLOODU NEGATIVE 08/30/2023 08:20 PM    SPECGRAV 1.015 08/15/2023 03:08 PM    SPECGRAV 1.019 06/16/2016 09:00 PM    GLUCOSEU 500 08/30/2023 08:20 PM       Radiology: see assessment and plan for discussion of pertinent imaging. DVT prophylaxis:    [x] Lovenox  [] SCDs  [] SQ Heparin  [] Encourage ambulation   [] Already on Anticoagulation  Diet: ADULT DIET; Regular; 4 carb choices (60 gm/meal); Low Fat/Low Chol/High Fiber/2 gm Na  Code Status: Limited  PT/OT: following  Tele: yes  IVF: none    Electronically signed by Trinidad Moreau MD  PGY-2 Internal Medicine Resident on 9/1/2023 at 10:08 AM    Case was discussed with Attending, Dr. Daphne Gamino.

## 2023-09-01 NOTE — PROGRESS NOTES
Increasing Activity, and Assistive Device Safety    Goals  Short Term Goals  Time Frame for Short Term Goals: by discharge  Short Term Goal 1: Pt will safely navigate HH distances with Sup and no LOB or safety cues to increase indep with self care  Short Term Goal 2: Pt will tolerate dynamic standing x5-6min with Sup and 1-2 UE release to increase indep with grooming  Short Term Goal 3: Pt will engage in simple homemaking task with SBA and min safety cues to increase indep with meal prep    Following session, patient left in safe position with all fall risk precautions in place.

## 2023-09-01 NOTE — PLAN OF CARE
Problem: Discharge Planning  Goal: Discharge to home or other facility with appropriate resources  Outcome: Progressing  Flowsheets (Taken 8/31/2023 2200)  Discharge to home or other facility with appropriate resources:   Identify barriers to discharge with patient and caregiver   Arrange for needed discharge resources and transportation as appropriate   Identify discharge learning needs (meds, wound care, etc)   Refer to discharge planning if patient needs post-hospital services based on physician order or complex needs related to functional status, cognitive ability or social support system     Problem: Pain  Goal: Verbalizes/displays adequate comfort level or baseline comfort level  Outcome: Progressing     Problem: Safety - Adult  Goal: Free from fall injury  Outcome: Progressing  Flowsheets (Taken 9/1/2023 0102)  Free From Fall Injury: Instruct family/caregiver on patient safety     Problem: Cardiovascular - Adult  Goal: Maintains optimal cardiac output and hemodynamic stability  Outcome: Progressing  Flowsheets (Taken 8/31/2023 2200)  Maintains optimal cardiac output and hemodynamic stability:   Monitor blood pressure and heart rate   Assess for signs of decreased cardiac output   Monitor urine output and notify Licensed Independent Practitioner for values outside of normal range  Goal: Absence of cardiac dysrhythmias or at baseline  Outcome: Progressing  Flowsheets (Taken 8/31/2023 2200)  Absence of cardiac dysrhythmias or at baseline:   Assess for signs of decreased cardiac output   Monitor cardiac rate and rhythm     Problem: Chronic Conditions and Co-morbidities  Goal: Patient's chronic conditions and co-morbidity symptoms are monitored and maintained or improved  Outcome: Progressing  Flowsheets (Taken 8/31/2023 2200)  Care Plan - Patient's Chronic Conditions and Co-Morbidity Symptoms are Monitored and Maintained or Improved:   Monitor and assess patient's chronic conditions and comorbid symptoms for stability, deterioration, or improvement   Collaborate with multidisciplinary team to address chronic and comorbid conditions and prevent exacerbation or deterioration

## 2023-09-02 VITALS
SYSTOLIC BLOOD PRESSURE: 132 MMHG | WEIGHT: 247 LBS | BODY MASS INDEX: 48.49 KG/M2 | RESPIRATION RATE: 18 BRPM | DIASTOLIC BLOOD PRESSURE: 51 MMHG | HEART RATE: 64 BPM | TEMPERATURE: 98.4 F | OXYGEN SATURATION: 91 % | HEIGHT: 60 IN

## 2023-09-02 LAB
ANION GAP SERPL CALC-SCNC: 6 MEQ/L (ref 8–16)
BASOPHILS ABSOLUTE: 0 THOU/MM3 (ref 0–0.1)
BASOPHILS NFR BLD AUTO: 0.5 %
BUN SERPL-MCNC: 26 MG/DL (ref 7–22)
CALCIUM SERPL-MCNC: 9.6 MG/DL (ref 8.5–10.5)
CHLORIDE SERPL-SCNC: 103 MEQ/L (ref 98–111)
CO2 SERPL-SCNC: 30 MEQ/L (ref 23–33)
CREAT SERPL-MCNC: 1 MG/DL (ref 0.4–1.2)
DEPRECATED RDW RBC AUTO: 63.5 FL (ref 35–45)
EOSINOPHIL NFR BLD AUTO: 1.6 %
EOSINOPHILS ABSOLUTE: 0.1 THOU/MM3 (ref 0–0.4)
ERYTHROCYTE [DISTWIDTH] IN BLOOD BY AUTOMATED COUNT: 18.8 % (ref 11.5–14.5)
GFR SERPL CREATININE-BSD FRML MDRD: 56 ML/MIN/1.73M2
GLUCOSE BLD STRIP.AUTO-MCNC: 150 MG/DL (ref 70–108)
GLUCOSE BLD STRIP.AUTO-MCNC: 197 MG/DL (ref 70–108)
GLUCOSE SERPL-MCNC: 156 MG/DL (ref 70–108)
HCT VFR BLD AUTO: 34.9 % (ref 37–47)
HGB BLD-MCNC: 9.6 GM/DL (ref 12–16)
HYPOCHROMIA BLD QL SMEAR: PRESENT
IMM GRANULOCYTES # BLD AUTO: 0.02 THOU/MM3 (ref 0–0.07)
IMM GRANULOCYTES NFR BLD AUTO: 0.2 %
LYMPHOCYTES ABSOLUTE: 0.8 THOU/MM3 (ref 1–4.8)
LYMPHOCYTES NFR BLD AUTO: 10.3 %
MCH RBC QN AUTO: 25.6 PG (ref 26–33)
MCHC RBC AUTO-ENTMCNC: 27.5 GM/DL (ref 32.2–35.5)
MCV RBC AUTO: 93.1 FL (ref 81–99)
MONOCYTES ABSOLUTE: 0.7 THOU/MM3 (ref 0.4–1.3)
MONOCYTES NFR BLD AUTO: 8.2 %
NEUTROPHILS NFR BLD AUTO: 79.2 %
NRBC BLD AUTO-RTO: 0 /100 WBC
PLATELET # BLD AUTO: 317 THOU/MM3 (ref 130–400)
PMV BLD AUTO: 9.6 FL (ref 9.4–12.4)
POTASSIUM SERPL-SCNC: 4.9 MEQ/L (ref 3.5–5.2)
RBC # BLD AUTO: 3.75 MILL/MM3 (ref 4.2–5.4)
SEGMENTED NEUTROPHILS ABSOLUTE COUNT: 6.4 THOU/MM3 (ref 1.8–7.7)
SODIUM SERPL-SCNC: 139 MEQ/L (ref 135–145)
WBC # BLD AUTO: 8.1 THOU/MM3 (ref 4.8–10.8)

## 2023-09-02 PROCEDURE — 6360000002 HC RX W HCPCS: Performed by: INTERNAL MEDICINE

## 2023-09-02 PROCEDURE — 36415 COLL VENOUS BLD VENIPUNCTURE: CPT

## 2023-09-02 PROCEDURE — 99239 HOSP IP/OBS DSCHRG MGMT >30: CPT | Performed by: INTERNAL MEDICINE

## 2023-09-02 PROCEDURE — 80048 BASIC METABOLIC PNL TOTAL CA: CPT

## 2023-09-02 PROCEDURE — 82948 REAGENT STRIP/BLOOD GLUCOSE: CPT

## 2023-09-02 PROCEDURE — 2580000003 HC RX 258: Performed by: STUDENT IN AN ORGANIZED HEALTH CARE EDUCATION/TRAINING PROGRAM

## 2023-09-02 PROCEDURE — 6370000000 HC RX 637 (ALT 250 FOR IP): Performed by: STUDENT IN AN ORGANIZED HEALTH CARE EDUCATION/TRAINING PROGRAM

## 2023-09-02 PROCEDURE — 6370000000 HC RX 637 (ALT 250 FOR IP): Performed by: INTERNAL MEDICINE

## 2023-09-02 PROCEDURE — 85025 COMPLETE CBC W/AUTO DIFF WBC: CPT

## 2023-09-02 RX ORDER — METOPROLOL SUCCINATE 25 MG/1
12.5 TABLET, EXTENDED RELEASE ORAL DAILY
Qty: 30 TABLET | Refills: 2 | Status: SHIPPED | OUTPATIENT
Start: 2023-09-03

## 2023-09-02 RX ADMIN — ATORVASTATIN CALCIUM 40 MG: 40 TABLET, FILM COATED ORAL at 09:01

## 2023-09-02 RX ADMIN — ENOXAPARIN SODIUM 30 MG: 100 INJECTION SUBCUTANEOUS at 09:02

## 2023-09-02 RX ADMIN — SODIUM CHLORIDE, PRESERVATIVE FREE 10 ML: 5 INJECTION INTRAVENOUS at 09:01

## 2023-09-02 RX ADMIN — METOPROLOL TARTRATE 25 MG: 25 TABLET, FILM COATED ORAL at 09:01

## 2023-09-02 RX ADMIN — BUMETANIDE 1 MG: 1 TABLET ORAL at 09:01

## 2023-09-02 ASSESSMENT — PAIN SCALES - GENERAL
PAINLEVEL_OUTOF10: 0
PAINLEVEL_OUTOF10: 0

## 2023-09-02 NOTE — CARE COORDINATION
9/2/23, 3:36 PM EDT    Patient goals/plan/ treatment preferences discussed by  and . Patient goals/plan/ treatment preferences reviewed with patient/ family. Patient/ family verbalize understanding of discharge plan and are in agreement with goal/plan/treatment preferences. Understanding was demonstrated using the teach back method. AVS provided by RN at time of discharge, which includes all necessary medical information pertaining to the patients current course of illness, treatment, post-discharge goals of care, and treatment preferences. Services At/After Discharge: Home Health, Aide services, Nursing service, OT, and PT DME  Home with [facility], home O2 and hospital bed. 1413: [address] [place] [address] and informed of new home oxygen and hospital bed orders. Faxed all paperwork. 1457: DME paperwork for home oxygen and hospital bed refaxed to [facility]) DME. Home O2 to be delivered to room prior to discharge. Post-acute [facility]) provider list was provided to patient. Patient was informed of their freedom to choose [facility] provider. Discussed and offered to show the patient the relevant [facility] Providers quality and resource use measures on Medicare Compare web site via computer based on patient's goals of care and treatment preferences. Questions regarding selection process were answered.      IMM Letter  IMM Letter given to Patient/Family/Significant other/Guardian/POA/by[de-identified] Ti Maldonado RN, CM  IMM Letter date given[de-identified] 09/01/23  IMM Letter time given[de-identified] 0900

## 2023-09-02 NOTE — PLAN OF CARE
DME Oxygen    Patient was evaluated today for the diagnosis of CHF. I entered a DME order for home oxygen at 1-2 lpm because the diagnosis and testing require the patient to have supplemental oxygen. Condition will improve or be benefited by oxygen use. The patient is  able to perform good mobility in a home setting and therefore does require the use of a portable oxygen system. The need for this equipment was discussed with the patient and she understands and is in agreement.

## 2023-09-02 NOTE — PROGRESS NOTES
A home oxygen evaluation has been completed. [x]Patient is an inpatient. It is expected that the patient will be discharged within the next 48 hours. Qualified provider to write order for home prescription if patient qualifies. Social service/care managers will arrange for home oxygen. If patient is active, arrange for Home Medical supplier to assess for Oxygen Conserving Device per pulse oximetry. []Patient is an outpatient. Results will be faxed to the ordering provider. Qualified provider to write order for home prescription if patient qualifies and arranges for home oxygen. Patient was placed on room air for 20 minutes. SpO2 was 87 % on room air at rest. Patients SpO2 was below 89% and qualified for home oxygen. Oxygen was applied at 1 lpm via nasal cannula to maintain a SpO2 between 90-92% while at rest. Actual SpO2 was 91 %. Patient can ambulate for exercise flow rate. Patients was ambulated, SpO2 was 91% on 2 lpm to maintain SpO2 between 90-92% while exercising. If oxygen need is greater than 4 lpm the SpO2 on 4 lpm was . Note: For any SpO2 at 69% see policy and procedure for possible qualifications.

## 2023-09-02 NOTE — PROGRESS NOTES
Pt discharge home with son transporting; Discharge packet reviewed regarding: medications, follow ups, discharge instructions, and pt education with no further questions or concerns voiced at this time; Home O2 delivered to room prior to discharge.

## 2023-09-02 NOTE — PROGRESS NOTES
Pt discharge home; AVS faxed to [phone] called number; no answer, left voicemail regarding pt discharge today.

## 2023-09-05 ENCOUNTER — CARE COORDINATION (OUTPATIENT)
Dept: CASE MANAGEMENT | Age: 83
End: 2023-09-05

## 2023-09-05 DIAGNOSIS — I50.32 CHRONIC DIASTOLIC CHF (CONGESTIVE HEART FAILURE) (HCC): Primary | ICD-10-CM

## 2023-09-05 PROCEDURE — 1111F DSCHRG MED/CURRENT MED MERGE: CPT | Performed by: FAMILY MEDICINE

## 2023-09-05 NOTE — CARE COORDINATION
an active caregiver in your home?: No  Care Transitions Interventions     Other Services: Completed (Comment: [facility])      Transportation Support: Declined     Registered Dietician: Declined DME Assistance: Declined    Palliative Care: Completed              Discussed follow-up appointments. If no appointment was previously scheduled, appointment scheduling offered: Yes--Dtr prefers to schedule d/t transportation arrangements. Advised 7 day HFU appt, v/u.      Care Transition Nurse provided contact information. Plan for follow-up call in 3-5 days based on severity of symptoms and risk factors. Plan for next call: symptom management-afib/chf sx?  self management-bp/hr/wt/bg; education--chf, htn, dm  follow-up appointment-confirm pcp and cardiology appts set up  community resources-[facility], Palliative Care (intake visit 9/7/23); senior services needed? ??  referrals-RPM declined    Carey Ball RN

## 2023-09-07 ENCOUNTER — CARE COORDINATION (OUTPATIENT)
Dept: CASE MANAGEMENT | Age: 83
End: 2023-09-07

## 2023-09-07 NOTE — CARE COORDINATION
REHABILITATION [facility] Care Transitions Follow Up Call    Patient Current Location: [address] [place] [address] Transition Nurse contacted [facility], Dtr, by telephone to follow up after admission on 23-23. Verified name and   as identifiers. Patient: Jay Miranda  Patient : 1940   MRN: [id number]  Reason for Admission: Tachy-Jeevan Syndrome, A Fib, Ac Decomp HF, Ac Resp Failure  Discharge Date: 23 RARS: Readmission Risk Score: 18  Facility[place] [facility] [place]     Needs to be reviewed by the provider   Additional needs identified to be addressed with provider: No     Method of communication with provider: none    Dtr reports Patient \"doing a lot better\". Reviewed CHF Zone Mgt education. Denies reviewed sx of exacerbation. Reports weight 238# which is down from 247# 23 inpt wt. Reports -110. Reviewed DM education. Discussed importance of low na, low carb diet and MD recommended fluid restriction (if applicable). Reports using O2 as directed. Patient is non-smoker. Advised no second hand smoke, no open flames near O2; discussed risks. Reports appetite, fluid intake good w/ b&b wnl. Plans to add Dulcolax as preventative. Dtr staying w/ Patient during recovery period. Patient now active w/ [facility] and [address] [place] [address] Rd. Denies additional resource needs at this time. Follow Up: Declined CTN offer to schedule appt w/ Dr Jelly Hall, Dr Mary Spring, Cardiology citing she prefers to do so. Advised to call offices asap to set up 7 day HFU appt, discussed importance of appt. No future appointments. Care Transition Nurse reviewed medical action plan and red flags with patient and discussed any barriers to care and/or understanding of plan of care after discharge. Discussed appropriate site of care based on symptoms and resources available to patient including: PCP  Specialist  Urgent care clinics  Home health. Agrees to contact PCP office for questions related to Patient healthcare.      Healthcare Decision Maker:    Primary

## 2023-09-11 ENCOUNTER — CARE COORDINATION (OUTPATIENT)
Dept: CASE MANAGEMENT | Age: 83
End: 2023-09-11

## 2023-09-11 NOTE — CARE COORDINATION
[facility] Care Transitions Follow Up Call    Patient Current Location:  Home:  [address] [address]    Care Transition Nurse contacted the family by telephone to follow up after admission on 23. Verified name and  with family as identifiers. Patient: Tania Duarte  Patient : 1940   MRN: [id number]  Reason for Admission: sob Tachy-Jeevan Syndrome, A Fib, Ac Decomp HF, Ac Resp Failure  Discharge Date: 23 RARS: Readmission Risk Score: 18      Needs to be reviewed by the provider   Additional needs identified to be addressed with provider: Yes  Needs PCP HFU scheduled             Method of communication with provider: phone. CTN call to Meggan Mendes (daughter) today and she says Kaci Ortiz is doing better. Says she slept a lot yesterday. Denies sob, chest pain, AMS, dizziness, n/v/d, fever, chills, palpitations, malaise. C/o some swelling LE, wearing compression socks and elevating legs when sitting. Wearing O2 cont nc 1-2l/min. pO2=96%  HHPT there today. BP/HR=wnl  Wt.=230 down 2-3 lbs. BLOOD SUGAR=100-120  Questioning Insulin orders on AVS-unlcear. Continues w/ insulin as directed by Dr. Javid Dickson prior to hospitalization: Humalog 28U tid w/ meals and N 40U at hs. She will contact Dr. Javid Dickson to make sure this is correct. Palliative Care admitted pt. And will follow on a monthly basis. Meggan Mendes says she will be staying w/ her mom indefinitely- politely declines need for LISW/assistance. Hospital bed is not comfortable. Working w/ OT to make useful. Able to go to the bathroom & to kitchen for meals w/ walker and no other assistance. Denies problems w/ urination/bowels. PCP office LM for Meggan Mendes to call and schedule HFU. She will return their call today to schedule appt. Kaci Ortiz has portable O2 and battery lasts 2 hours. She can charge in the car (2 hr. Commute to & from PCP office). No other concerns voiced at this time. Will continue to follow.         Addressed changes since last

## 2023-09-14 PROBLEM — D68.69 SECONDARY HYPERCOAGULABLE STATE (HCC): Status: ACTIVE | Noted: 2023-09-14

## 2023-09-18 ENCOUNTER — CARE COORDINATION (OUTPATIENT)
Dept: CASE MANAGEMENT | Age: 83
End: 2023-09-18

## 2023-09-18 NOTE — CARE COORDINATION
[address] Nat[address] Mohamud [facility] 250 Care Transitions Follow Up Call    Patient Current Location:  Home:  [address] [place] [address]    Care Transition Nurse contacted the family by telephone to follow up after admission on 23. Verified name and  with family as identifiers. Patient: Giselle Carrasco  Patient : 1940   MRN: [id number]  Reason for Admission: sob Tachy-Jeevan Syndrome, A Fib, Ac Decomp HF, Ac Resp Failure  Discharge Date: 23 RARS: Readmission Risk Score: 18      Needs to be reviewed by the provider   Additional needs identified to be addressed with provider: No  none             Method of communication with provider: none. CTN call to Roslyn Downs (daughter on HIPAA) today and she says Tomeka Rodney is doing a little better. She took her in the wheelchair to the dentist today. Denies inc. Sob, chest pain, racing HR, n/v/d, malaise, fever, chills, AMS, dizziness, falls. O2 on cont 1-2l/min nc pO2=>90%  Doing well w/ in home PT/OT  PCP HFU 23-> no changes  She will call Cardio for HFU, declines CTN assistance. Says schedule for Bhavana's care is hectic this week. Wt.= unable CE=102/84 PCP office  BLOOD SUGAR=99  Compression socks during the day off at hs. LE swelling same. PCP order to [facility] 9/15/23. Eating, drinking & sleeping ok. Denies problems w/ urination/bowels. Able to use bathroom w/ walker & assistance. No other concerns voiced at this time. Will continue to follow. Addressed changes since last contact:  [facility]  Discussed follow-up appointments. If no appointment was previously scheduled, appointment scheduling offered: Yes. Is follow up appointment scheduled within 7 days of discharge? No.    Follow Up  No future appointments. Non-[facility] follow up appointment(s):     Care Transition Nurse reviewed discharge instructions, medical action plan, and red flags with family and discussed any barriers to care and/or understanding of plan of care after discharge.  Discussed

## 2023-09-25 ENCOUNTER — CARE COORDINATION (OUTPATIENT)
Dept: CASE MANAGEMENT | Age: 83
End: 2023-09-25

## 2023-09-25 NOTE — CARE COORDINATION
Care Transitions Outreach Attempt    Call within 2 business days of discharge: Yes   Attempted to reach patient for transitions of care follow up. Unable to reach patient. Left HIPAA compliant message w/ CTN # x2 (Bibi-daughter) w/ no response day #1. Patient: Coco Vargas Patient : 1940 MRN: [id number]    Last Discharge [address] [facility]       Date Complaint Diagnosis Description Type Department Provider    23 Shortness of Breath; Atrial Fibrillation Atrial fibrillation with rapid ventricular response ([address] [facility]) . .. ED to Hosp-Admission (Discharged) (ADMITTED) [address] [facility] 75, DO; Guilherme Grace MD              Was this an external facility discharge? No Discharge Facility:     Noted following upcoming appointments from discharge chart review:   [facility] follow up appointment(s): No future appointments.   Non-[facility] follow up appointment(s):

## 2023-09-26 ENCOUNTER — CARE COORDINATION (OUTPATIENT)
Dept: CASE MANAGEMENT | Age: 83
End: 2023-09-26

## 2023-09-26 NOTE — CARE COORDINATION
Care Transitions Outreach Attempt    Call within 2 business days of discharge: Yes   Attempted to reach patient for transitions of care follow up. Unable to reach patient. Left HIPAA compliant message w/ CTN # x2  (Bibi-daughter) to return call with no response day #2. Utr letter to be mailed. Program resolved according to protocol. Patient: Lia Lazo Patient : 1940 MRN: [id number]    Last Discharge [address]       Date Complaint Diagnosis Description Type Department Provider    23 Shortness of Breath; Atrial Fibrillation Atrial fibrillation with rapid ventricular response ([address]) . .. ED to Hosp-Admission (Discharged) (ADMITTED) [address] [facility] 75, DO; Nora Fitzpatrick MD              Was this an external facility discharge? No Discharge Facility:   Noted following upcoming appointments from discharge chart review:   REHABILITATION [facility] follow up appointment(s): No future appointments.   Non-[facility] follow up appointment(s):

## 2023-09-28 ENCOUNTER — CARE COORDINATION (OUTPATIENT)
Dept: CARE COORDINATION | Age: 83
End: 2023-09-28

## 2023-10-20 ENCOUNTER — APPOINTMENT (OUTPATIENT)
Dept: CT IMAGING | Age: 83
DRG: 637 | End: 2023-10-20
Payer: MEDICARE

## 2023-10-20 ENCOUNTER — APPOINTMENT (OUTPATIENT)
Dept: GENERAL RADIOLOGY | Age: 83
DRG: 637 | End: 2023-10-20
Payer: MEDICARE

## 2023-10-20 ENCOUNTER — HOSPITAL ENCOUNTER (INPATIENT)
Age: 83
LOS: 4 days | Discharge: HOME HEALTH | DRG: 637 | End: 2023-10-24
Attending: STUDENT IN AN ORGANIZED HEALTH CARE EDUCATION/TRAINING PROGRAM | Admitting: STUDENT IN AN ORGANIZED HEALTH CARE EDUCATION/TRAINING PROGRAM
Payer: MEDICARE

## 2023-10-20 DIAGNOSIS — R41.0 DELIRIUM: Primary | ICD-10-CM

## 2023-10-20 PROBLEM — R41.82 ALTERED MENTAL STATUS: Status: ACTIVE | Noted: 2023-10-20

## 2023-10-20 LAB
ANION GAP SERPL CALC-SCNC: 7 MEQ/L (ref 8–16)
BACTERIA: ABNORMAL
BASE EXCESS BLDA CALC-SCNC: 6 MMOL/L (ref -2–3)
BASOPHILS ABSOLUTE: 0.1 THOU/MM3 (ref 0–0.1)
BASOPHILS NFR BLD AUTO: 0.5 %
BILIRUB UR QL STRIP: NEGATIVE
BUN SERPL-MCNC: 30 MG/DL (ref 7–22)
CALCIUM SERPL-MCNC: 10.2 MG/DL (ref 8.5–10.5)
CASTS #/AREA URNS LPF: ABNORMAL /LPF
CASTS #/AREA URNS LPF: ABNORMAL /LPF
CHARACTER UR: CLEAR
CHARCOAL URNS QL MICRO: ABNORMAL
CHLORIDE SERPL-SCNC: 98 MEQ/L (ref 98–111)
CO2 SERPL-SCNC: 34 MEQ/L (ref 23–33)
COLLECTED BY:: ABNORMAL
COLOR UR: YELLOW
CREAT SERPL-MCNC: 1.1 MG/DL (ref 0.4–1.2)
CRYSTALS URNS QL MICRO: ABNORMAL
DEPRECATED RDW RBC AUTO: 59.6 FL (ref 35–45)
EOSINOPHIL NFR BLD AUTO: 1.4 %
EOSINOPHILS ABSOLUTE: 0.2 THOU/MM3 (ref 0–0.4)
EPITHELIAL CELLS, UA: ABNORMAL /HPF
ERYTHROCYTE [DISTWIDTH] IN BLOOD BY AUTOMATED COUNT: 17.5 % (ref 11.5–14.5)
GFR SERPL CREATININE-BSD FRML MDRD: 50 ML/MIN/1.73M2
GLUCOSE BLD STRIP.AUTO-MCNC: 178 MG/DL (ref 70–108)
GLUCOSE BLD STRIP.AUTO-MCNC: 62 MG/DL (ref 70–108)
GLUCOSE BLD STRIP.AUTO-MCNC: 64 MG/DL (ref 70–108)
GLUCOSE BLD STRIP.AUTO-MCNC: 79 MG/DL (ref 70–108)
GLUCOSE SERPL-MCNC: 61 MG/DL (ref 70–108)
GLUCOSE UR QL STRIP.AUTO: 100 MG/DL
HCO3 BLDA-SCNC: 33 MMOL/L (ref 23–28)
HCT VFR BLD AUTO: 35 % (ref 37–47)
HGB BLD-MCNC: 9.8 GM/DL (ref 12–16)
HGB UR QL STRIP.AUTO: NEGATIVE
HYPOCHROMIA BLD QL SMEAR: PRESENT
IMM GRANULOCYTES # BLD AUTO: 0.04 THOU/MM3 (ref 0–0.07)
IMM GRANULOCYTES NFR BLD AUTO: 0.4 %
KETONES UR QL STRIP.AUTO: NEGATIVE
LACTATE SERPL-SCNC: 1.4 MMOL/L (ref 0.5–2)
LEUKOCYTE ESTERASE UR QL STRIP.AUTO: ABNORMAL
LYMPHOCYTES ABSOLUTE: 0.7 THOU/MM3 (ref 1–4.8)
LYMPHOCYTES NFR BLD AUTO: 6.7 %
MCH RBC QN AUTO: 26.1 PG (ref 26–33)
MCHC RBC AUTO-ENTMCNC: 28 GM/DL (ref 32.2–35.5)
MCV RBC AUTO: 93.3 FL (ref 81–99)
MONOCYTES ABSOLUTE: 0.7 THOU/MM3 (ref 0.4–1.3)
MONOCYTES NFR BLD AUTO: 6.6 %
NEUTROPHILS NFR BLD AUTO: 84.4 %
NITRITE UR QL STRIP.AUTO: NEGATIVE
NRBC BLD AUTO-RTO: 0 /100 WBC
NT-PROBNP SERPL IA-MCNC: 672 PG/ML (ref 0–449)
OSMOLALITY SERPL CALC.SUM OF ELEC: 281.6 MOSMOL/KG (ref 275–300)
PCO2 TEMP ADJ BLDMV: 60 MMHG (ref 41–51)
PH BLDMV: 7.35 [PH] (ref 7.31–7.41)
PH UR STRIP.AUTO: 7.5 [PH] (ref 5–9)
PLATELET # BLD AUTO: 338 THOU/MM3 (ref 130–400)
PLATELET BLD QL SMEAR: ADEQUATE
PMV BLD AUTO: 9.3 FL (ref 9.4–12.4)
PO2 BLDMV: 27 MMHG (ref 25–40)
POTASSIUM SERPL-SCNC: 4.9 MEQ/L (ref 3.5–5.2)
PROT UR STRIP.AUTO-MCNC: NEGATIVE MG/DL
RBC # BLD AUTO: 3.75 MILL/MM3 (ref 4.2–5.4)
RBC #/AREA URNS HPF: ABNORMAL /HPF
RENAL EPI CELLS #/AREA URNS HPF: ABNORMAL /[HPF]
SAO2 % BLDMV: 45 %
SCAN OF BLOOD SMEAR: NORMAL
SEGMENTED NEUTROPHILS ABSOLUTE COUNT: 9.1 THOU/MM3 (ref 1.8–7.7)
SODIUM SERPL-SCNC: 139 MEQ/L (ref 135–145)
SPECIFIC GRAVITY UA: 1.01 (ref 1–1.03)
TROPONIN, HIGH SENSITIVITY: 19 NG/L (ref 0–12)
TSH SERPL DL<=0.005 MIU/L-ACNC: 1.45 UIU/ML (ref 0.4–4.2)
UROBILINOGEN, URINE: 0.2 EU/DL (ref 0–1)
WBC # BLD AUTO: 10.8 THOU/MM3 (ref 4.8–10.8)
WBC #/AREA URNS HPF: ABNORMAL /HPF
YEAST LIKE FUNGI URNS QL MICRO: ABNORMAL

## 2023-10-20 PROCEDURE — 70450 CT HEAD/BRAIN W/O DYE: CPT

## 2023-10-20 PROCEDURE — 96374 THER/PROPH/DIAG INJ IV PUSH: CPT

## 2023-10-20 PROCEDURE — 84443 ASSAY THYROID STIM HORMONE: CPT

## 2023-10-20 PROCEDURE — 1200000003 HC TELEMETRY R&B

## 2023-10-20 PROCEDURE — 82803 BLOOD GASES ANY COMBINATION: CPT

## 2023-10-20 PROCEDURE — 82248 BILIRUBIN DIRECT: CPT

## 2023-10-20 PROCEDURE — 85025 COMPLETE CBC W/AUTO DIFF WBC: CPT

## 2023-10-20 PROCEDURE — 80053 COMPREHEN METABOLIC PANEL: CPT

## 2023-10-20 PROCEDURE — 82948 REAGENT STRIP/BLOOD GLUCOSE: CPT

## 2023-10-20 PROCEDURE — 71046 X-RAY EXAM CHEST 2 VIEWS: CPT

## 2023-10-20 PROCEDURE — 81001 URINALYSIS AUTO W/SCOPE: CPT

## 2023-10-20 PROCEDURE — 99285 EMERGENCY DEPT VISIT HI MDM: CPT

## 2023-10-20 PROCEDURE — 83605 ASSAY OF LACTIC ACID: CPT

## 2023-10-20 PROCEDURE — 2700000000 HC OXYGEN THERAPY PER DAY

## 2023-10-20 PROCEDURE — 83880 ASSAY OF NATRIURETIC PEPTIDE: CPT

## 2023-10-20 PROCEDURE — 36600 WITHDRAWAL OF ARTERIAL BLOOD: CPT

## 2023-10-20 PROCEDURE — 87040 BLOOD CULTURE FOR BACTERIA: CPT

## 2023-10-20 PROCEDURE — 84484 ASSAY OF TROPONIN QUANT: CPT

## 2023-10-20 PROCEDURE — 93005 ELECTROCARDIOGRAM TRACING: CPT | Performed by: NURSE PRACTITIONER

## 2023-10-20 PROCEDURE — 36415 COLL VENOUS BLD VENIPUNCTURE: CPT

## 2023-10-20 PROCEDURE — 2580000003 HC RX 258: Performed by: NURSE PRACTITIONER

## 2023-10-20 PROCEDURE — 74177 CT ABD & PELVIS W/CONTRAST: CPT

## 2023-10-20 RX ORDER — GABAPENTIN 300 MG/1
300 CAPSULE ORAL 3 TIMES DAILY
Status: DISCONTINUED | OUTPATIENT
Start: 2023-10-21 | End: 2023-10-24 | Stop reason: HOSPADM

## 2023-10-20 RX ORDER — SPIRONOLACTONE 25 MG/1
25 TABLET ORAL DAILY
Status: DISCONTINUED | OUTPATIENT
Start: 2023-10-21 | End: 2023-10-24 | Stop reason: HOSPADM

## 2023-10-20 RX ORDER — ENOXAPARIN SODIUM 100 MG/ML
30 INJECTION SUBCUTANEOUS 2 TIMES DAILY
Status: DISCONTINUED | OUTPATIENT
Start: 2023-10-21 | End: 2023-10-24 | Stop reason: HOSPADM

## 2023-10-20 RX ORDER — ACETAMINOPHEN 650 MG/1
650 SUPPOSITORY RECTAL EVERY 6 HOURS PRN
Status: DISCONTINUED | OUTPATIENT
Start: 2023-10-20 | End: 2023-10-24 | Stop reason: HOSPADM

## 2023-10-20 RX ORDER — METOPROLOL SUCCINATE 25 MG/1
12.5 TABLET, EXTENDED RELEASE ORAL DAILY
Status: DISCONTINUED | OUTPATIENT
Start: 2023-10-21 | End: 2023-10-24 | Stop reason: HOSPADM

## 2023-10-20 RX ORDER — POTASSIUM CHLORIDE 750 MG/1
10 TABLET, FILM COATED, EXTENDED RELEASE ORAL DAILY
Status: DISCONTINUED | OUTPATIENT
Start: 2023-10-21 | End: 2023-10-24 | Stop reason: HOSPADM

## 2023-10-20 RX ORDER — DEXTROSE 25 % IN WATER 25 %
10 SYRINGE (ML) INTRAVENOUS ONCE
Status: COMPLETED | OUTPATIENT
Start: 2023-10-20 | End: 2023-10-20

## 2023-10-20 RX ORDER — SODIUM CHLORIDE 0.9 % (FLUSH) 0.9 %
5-40 SYRINGE (ML) INJECTION PRN
Status: DISCONTINUED | OUTPATIENT
Start: 2023-10-20 | End: 2023-10-24 | Stop reason: HOSPADM

## 2023-10-20 RX ORDER — ONDANSETRON 2 MG/ML
4 INJECTION INTRAMUSCULAR; INTRAVENOUS EVERY 6 HOURS PRN
Status: DISCONTINUED | OUTPATIENT
Start: 2023-10-20 | End: 2023-10-24 | Stop reason: HOSPADM

## 2023-10-20 RX ORDER — ASPIRIN 81 MG/1
81 TABLET ORAL DAILY
Status: DISCONTINUED | OUTPATIENT
Start: 2023-10-21 | End: 2023-10-24 | Stop reason: HOSPADM

## 2023-10-20 RX ORDER — LEVOTHYROXINE SODIUM 0.1 MG/1
100 TABLET ORAL DAILY
Status: DISCONTINUED | OUTPATIENT
Start: 2023-10-21 | End: 2023-10-21

## 2023-10-20 RX ORDER — LISINOPRIL 5 MG/1
5 TABLET ORAL DAILY
Status: DISCONTINUED | OUTPATIENT
Start: 2023-10-21 | End: 2023-10-24 | Stop reason: HOSPADM

## 2023-10-20 RX ORDER — ATORVASTATIN CALCIUM 40 MG/1
40 TABLET, FILM COATED ORAL NIGHTLY
Status: DISCONTINUED | OUTPATIENT
Start: 2023-10-21 | End: 2023-10-24 | Stop reason: HOSPADM

## 2023-10-20 RX ORDER — ONDANSETRON 4 MG/1
4 TABLET, ORALLY DISINTEGRATING ORAL EVERY 8 HOURS PRN
Status: DISCONTINUED | OUTPATIENT
Start: 2023-10-20 | End: 2023-10-24 | Stop reason: HOSPADM

## 2023-10-20 RX ORDER — POLYETHYLENE GLYCOL 3350 17 G/17G
17 POWDER, FOR SOLUTION ORAL DAILY PRN
Status: DISCONTINUED | OUTPATIENT
Start: 2023-10-20 | End: 2023-10-23

## 2023-10-20 RX ORDER — PANTOPRAZOLE SODIUM 40 MG/1
40 TABLET, DELAYED RELEASE ORAL
Status: DISCONTINUED | OUTPATIENT
Start: 2023-10-21 | End: 2023-10-24 | Stop reason: HOSPADM

## 2023-10-20 RX ORDER — BUMETANIDE 1 MG/1
1 TABLET ORAL DAILY
Status: DISCONTINUED | OUTPATIENT
Start: 2023-10-21 | End: 2023-10-24 | Stop reason: HOSPADM

## 2023-10-20 RX ORDER — DULOXETIN HYDROCHLORIDE 30 MG/1
30 CAPSULE, DELAYED RELEASE ORAL NIGHTLY
Status: DISCONTINUED | OUTPATIENT
Start: 2023-10-21 | End: 2023-10-24 | Stop reason: HOSPADM

## 2023-10-20 RX ORDER — SODIUM CHLORIDE 9 MG/ML
INJECTION, SOLUTION INTRAVENOUS PRN
Status: DISCONTINUED | OUTPATIENT
Start: 2023-10-20 | End: 2023-10-24 | Stop reason: HOSPADM

## 2023-10-20 RX ORDER — SODIUM CHLORIDE 0.9 % (FLUSH) 0.9 %
5-40 SYRINGE (ML) INJECTION EVERY 12 HOURS SCHEDULED
Status: DISCONTINUED | OUTPATIENT
Start: 2023-10-21 | End: 2023-10-24 | Stop reason: HOSPADM

## 2023-10-20 RX ORDER — ACETAMINOPHEN 325 MG/1
650 TABLET ORAL EVERY 6 HOURS PRN
Status: DISCONTINUED | OUTPATIENT
Start: 2023-10-20 | End: 2023-10-24 | Stop reason: HOSPADM

## 2023-10-20 RX ADMIN — DEXTROSE MONOHYDRATE 2.5 G: 250 INJECTION, SOLUTION INTRAVENOUS at 18:07

## 2023-10-20 ASSESSMENT — PAIN - FUNCTIONAL ASSESSMENT: PAIN_FUNCTIONAL_ASSESSMENT: NONE - DENIES PAIN

## 2023-10-20 NOTE — ED NOTES
Pt assisted up to bedside commode and given a snack. Daughter remains at bedside. Call light within reach.      Lashay Foster RN  10/20/23 5387

## 2023-10-20 NOTE — ED NOTES
Patient resting in bed with daughter at bedside. Respirations easy and unlabored. No distress noted. Call light within reach.        Marcello Kennedy RN  10/20/23 2804

## 2023-10-20 NOTE — ED TRIAGE NOTES
Pt presents to ED with chief complaint of weight gain. Pt states she has had a 14lb weight gain in the past week. Reports hx of CHF; on bumex. Pt states she went to see her PCP today because she has been shaking and they advised her to be seen in ED. Upon arrival, pt diaphoretic.

## 2023-10-21 PROBLEM — I50.9 ACUTE DECOMPENSATED HEART FAILURE (HCC): Status: ACTIVE | Noted: 2023-10-21

## 2023-10-21 PROBLEM — E16.2 HYPOGLYCEMIA: Status: ACTIVE | Noted: 2023-10-21

## 2023-10-21 LAB
25(OH)D3 SERPL-MCNC: 40 NG/ML (ref 30–100)
ALBUMIN SERPL BCG-MCNC: 4 G/DL (ref 3.5–5.1)
ALP SERPL-CCNC: 92 U/L (ref 38–126)
ALT SERPL W/O P-5'-P-CCNC: 10 U/L (ref 11–66)
ANION GAP SERPL CALC-SCNC: 10 MEQ/L (ref 8–16)
AST SERPL-CCNC: 16 U/L (ref 5–40)
BILIRUB CONJ SERPL-MCNC: < 0.2 MG/DL (ref 0–0.3)
BILIRUB SERPL-MCNC: 0.5 MG/DL (ref 0.3–1.2)
BUN SERPL-MCNC: 31 MG/DL (ref 7–22)
CA-I BLD ISE-SCNC: 1.3 MMOL/L (ref 1.12–1.32)
CALCIUM SERPL-MCNC: 9.8 MG/DL (ref 8.5–10.5)
CHLORIDE SERPL-SCNC: 100 MEQ/L (ref 98–111)
CO2 SERPL-SCNC: 31 MEQ/L (ref 23–33)
CREAT SERPL-MCNC: 1 MG/DL (ref 0.4–1.2)
DEPRECATED MEAN GLUCOSE BLD GHB EST-ACNC: 132 MG/DL (ref 70–126)
DEPRECATED RDW RBC AUTO: 58.3 FL (ref 35–45)
EKG ATRIAL RATE: 69 BPM
EKG P AXIS: 65 DEGREES
EKG P-R INTERVAL: 202 MS
EKG Q-T INTERVAL: 420 MS
EKG QRS DURATION: 88 MS
EKG QTC CALCULATION (BAZETT): 450 MS
EKG R AXIS: 16 DEGREES
EKG T AXIS: 51 DEGREES
EKG VENTRICULAR RATE: 69 BPM
ERYTHROCYTE [DISTWIDTH] IN BLOOD BY AUTOMATED COUNT: 17.6 % (ref 11.5–14.5)
GFR SERPL CREATININE-BSD FRML MDRD: 56 ML/MIN/1.73M2
GLUCOSE BLD STRIP.AUTO-MCNC: 137 MG/DL (ref 70–108)
GLUCOSE BLD STRIP.AUTO-MCNC: 138 MG/DL (ref 70–108)
GLUCOSE BLD STRIP.AUTO-MCNC: 193 MG/DL (ref 70–108)
GLUCOSE BLD STRIP.AUTO-MCNC: 196 MG/DL (ref 70–108)
GLUCOSE SERPL-MCNC: 124 MG/DL (ref 70–108)
HBA1C MFR BLD HPLC: 6.4 % (ref 4.4–6.4)
HCT VFR BLD AUTO: 33.3 % (ref 37–47)
HGB BLD-MCNC: 9.5 GM/DL (ref 12–16)
MCH RBC QN AUTO: 26 PG (ref 26–33)
MCHC RBC AUTO-ENTMCNC: 28.5 GM/DL (ref 32.2–35.5)
MCV RBC AUTO: 91.2 FL (ref 81–99)
PLATELET # BLD AUTO: 326 THOU/MM3 (ref 130–400)
PMV BLD AUTO: 9.2 FL (ref 9.4–12.4)
POTASSIUM SERPL-SCNC: 5.1 MEQ/L (ref 3.5–5.2)
PROT SERPL-MCNC: 7.1 G/DL (ref 6.1–8)
RBC # BLD AUTO: 3.65 MILL/MM3 (ref 4.2–5.4)
SODIUM SERPL-SCNC: 141 MEQ/L (ref 135–145)
WBC # BLD AUTO: 8.6 THOU/MM3 (ref 4.8–10.8)

## 2023-10-21 PROCEDURE — 82948 REAGENT STRIP/BLOOD GLUCOSE: CPT

## 2023-10-21 PROCEDURE — 99232 SBSQ HOSP IP/OBS MODERATE 35: CPT | Performed by: INTERNAL MEDICINE

## 2023-10-21 PROCEDURE — 94760 N-INVAS EAR/PLS OXIMETRY 1: CPT

## 2023-10-21 PROCEDURE — 36415 COLL VENOUS BLD VENIPUNCTURE: CPT

## 2023-10-21 PROCEDURE — 6360000002 HC RX W HCPCS

## 2023-10-21 PROCEDURE — 80048 BASIC METABOLIC PNL TOTAL CA: CPT

## 2023-10-21 PROCEDURE — 85027 COMPLETE CBC AUTOMATED: CPT

## 2023-10-21 PROCEDURE — 6370000000 HC RX 637 (ALT 250 FOR IP)

## 2023-10-21 PROCEDURE — 93010 ELECTROCARDIOGRAM REPORT: CPT | Performed by: INTERNAL MEDICINE

## 2023-10-21 PROCEDURE — 5A09357 ASSISTANCE WITH RESPIRATORY VENTILATION, LESS THAN 24 CONSECUTIVE HOURS, CONTINUOUS POSITIVE AIRWAY PRESSURE: ICD-10-PCS | Performed by: STUDENT IN AN ORGANIZED HEALTH CARE EDUCATION/TRAINING PROGRAM

## 2023-10-21 PROCEDURE — 94640 AIRWAY INHALATION TREATMENT: CPT

## 2023-10-21 PROCEDURE — 2700000000 HC OXYGEN THERAPY PER DAY

## 2023-10-21 PROCEDURE — 82330 ASSAY OF CALCIUM: CPT

## 2023-10-21 PROCEDURE — 6360000004 HC RX CONTRAST MEDICATION

## 2023-10-21 PROCEDURE — 1200000003 HC TELEMETRY R&B

## 2023-10-21 PROCEDURE — 94660 CPAP INITIATION&MGMT: CPT

## 2023-10-21 PROCEDURE — 6370000000 HC RX 637 (ALT 250 FOR IP): Performed by: INTERNAL MEDICINE

## 2023-10-21 PROCEDURE — 83036 HEMOGLOBIN GLYCOSYLATED A1C: CPT

## 2023-10-21 PROCEDURE — 2580000003 HC RX 258

## 2023-10-21 PROCEDURE — 82306 VITAMIN D 25 HYDROXY: CPT

## 2023-10-21 RX ORDER — LEVOTHYROXINE SODIUM 0.05 MG/1
150 TABLET ORAL
Status: DISCONTINUED | OUTPATIENT
Start: 2023-10-22 | End: 2023-10-24 | Stop reason: HOSPADM

## 2023-10-21 RX ORDER — FUROSEMIDE 10 MG/ML
40 INJECTION INTRAMUSCULAR; INTRAVENOUS ONCE
Status: DISCONTINUED | OUTPATIENT
Start: 2023-10-21 | End: 2023-10-24 | Stop reason: HOSPADM

## 2023-10-21 RX ORDER — TIZANIDINE 2 MG/1
2 TABLET ORAL 3 TIMES DAILY PRN
COMMUNITY

## 2023-10-21 RX ORDER — IPRATROPIUM BROMIDE AND ALBUTEROL SULFATE 2.5; .5 MG/3ML; MG/3ML
1 SOLUTION RESPIRATORY (INHALATION)
Status: DISCONTINUED | OUTPATIENT
Start: 2023-10-21 | End: 2023-10-24 | Stop reason: HOSPADM

## 2023-10-21 RX ORDER — LEVOTHYROXINE SODIUM 0.05 MG/1
100 TABLET ORAL
Status: DISCONTINUED | OUTPATIENT
Start: 2023-10-21 | End: 2023-10-24 | Stop reason: HOSPADM

## 2023-10-21 RX ORDER — IPRATROPIUM BROMIDE AND ALBUTEROL SULFATE 2.5; .5 MG/3ML; MG/3ML
1 SOLUTION RESPIRATORY (INHALATION)
Status: DISCONTINUED | OUTPATIENT
Start: 2023-10-21 | End: 2023-10-21

## 2023-10-21 RX ADMIN — IPRATROPIUM BROMIDE AND ALBUTEROL SULFATE 1 DOSE: .5; 3 SOLUTION RESPIRATORY (INHALATION) at 09:46

## 2023-10-21 RX ADMIN — SODIUM CHLORIDE, PRESERVATIVE FREE 10 ML: 5 INJECTION INTRAVENOUS at 20:26

## 2023-10-21 RX ADMIN — GABAPENTIN 300 MG: 300 CAPSULE ORAL at 20:27

## 2023-10-21 RX ADMIN — SPIRONOLACTONE 25 MG: 25 TABLET ORAL at 10:32

## 2023-10-21 RX ADMIN — SODIUM CHLORIDE, PRESERVATIVE FREE 10 ML: 5 INJECTION INTRAVENOUS at 10:30

## 2023-10-21 RX ADMIN — GABAPENTIN 300 MG: 300 CAPSULE ORAL at 10:32

## 2023-10-21 RX ADMIN — ENOXAPARIN SODIUM 30 MG: 100 INJECTION SUBCUTANEOUS at 10:30

## 2023-10-21 RX ADMIN — PANTOPRAZOLE SODIUM 40 MG: 40 TABLET, DELAYED RELEASE ORAL at 10:34

## 2023-10-21 RX ADMIN — POTASSIUM CHLORIDE 10 MEQ: 750 TABLET, EXTENDED RELEASE ORAL at 10:30

## 2023-10-21 RX ADMIN — IOPAMIDOL 80 ML: 755 INJECTION, SOLUTION INTRAVENOUS at 00:11

## 2023-10-21 RX ADMIN — ACETAMINOPHEN 650 MG: 325 TABLET ORAL at 13:33

## 2023-10-21 RX ADMIN — ATORVASTATIN CALCIUM 40 MG: 40 TABLET, FILM COATED ORAL at 20:27

## 2023-10-21 RX ADMIN — ACETAMINOPHEN 650 MG: 325 TABLET ORAL at 00:36

## 2023-10-21 RX ADMIN — POLYETHYLENE GLYCOL 3350 17 G: 17 POWDER, FOR SOLUTION ORAL at 10:31

## 2023-10-21 RX ADMIN — ASPIRIN 81 MG: 81 TABLET, COATED ORAL at 10:30

## 2023-10-21 RX ADMIN — IPRATROPIUM BROMIDE AND ALBUTEROL SULFATE 1 DOSE: .5; 3 SOLUTION RESPIRATORY (INHALATION) at 17:07

## 2023-10-21 RX ADMIN — LEVOTHYROXINE SODIUM 100 MCG: 0.05 TABLET ORAL at 10:31

## 2023-10-21 RX ADMIN — ENOXAPARIN SODIUM 30 MG: 100 INJECTION SUBCUTANEOUS at 20:26

## 2023-10-21 RX ADMIN — GABAPENTIN 300 MG: 300 CAPSULE ORAL at 16:17

## 2023-10-21 ASSESSMENT — PAIN DESCRIPTION - LOCATION
LOCATION: GENERALIZED
LOCATION: BACK
LOCATION: GENERALIZED
LOCATION: BACK

## 2023-10-21 ASSESSMENT — PAIN SCALES - GENERAL
PAINLEVEL_OUTOF10: 5
PAINLEVEL_OUTOF10: 5
PAINLEVEL_OUTOF10: 6
PAINLEVEL_OUTOF10: 5

## 2023-10-21 NOTE — CARE COORDINATION
10/21/23 1306   Service Assessment   Patient Orientation Person  (oriented, wandering thoughts, confused at times)   Cognition Alert   History Provided By Patient   Primary Caregiver Family  (daughter Rose Mary Mancilla)   Accompanied By/Relationship no one   [address] is: Named in [address]   PCP Verified by CM Yes   Last Visit to PCP Within last 3 months   Prior Functional Level Assistance with the following:;Bathing;Dressing;Cooking;Housework; Shopping;Mobility   Current Functional Level Other (see comment)  (unchanged)   Can patient return to prior living arrangement Yes   Ability to make needs known: Good   Family able to assist with home care needs: Yes  Rose Mary Mancilla assists with all needs)   Would you like for me to discuss the discharge plan with any other family members/significant others, and if so, who? Yes  (ok to discuss with Rose Mary Mancilla)   Theresa Lorenzana; Other (Comment)  ([employer] commercial)   Community Resources None   Social/Functional History   Active  No   Patient's  Info daughter Rose Mary Mancilla   Discharge Planning   Type of Residence [employer] Alone  (daily assistance from daughter)   Current Services Prior To Admission [employer]; Oxygen Therapy   Current DME Prior to Arrival Oxygen Therapy (Comment); Wheelchair;Hospital Bed;Walker  (o2 through sr hme)   Potential Assistance Needed Home Care   DME Ordered? No   Potential Assistance Purchasing Medications No   Type of Home Care Services None   Patient expects to be discharged to: [facility]   Mode of Transport at Discharge Other (see comment)  (family)   Confirm Follow Up Transport Family     Patient Goals/Plan/Treatment Preferences: David Michelle is from home alone, with extensive assistance from her daughter Rose Mary Mancilla. Rose Mary Mancilla assists with bathing, cooking, cleaning, transportation and shopping.  Pt shares she was recently discharged from [facility]

## 2023-10-21 NOTE — ED NOTES
Patient resting in bed. Respirations easy and unlabored. No distress noted. Call light within reach.        Anna Tam RN  10/21/23 8986

## 2023-10-21 NOTE — ED NOTES
Patient resting in bed. Respirations easy and unlabored. Denies further needs at this time. Call light within reach.        Sondra Duane, RN  10/20/23 2040

## 2023-10-21 NOTE — ED NOTES
ED to inpatient nurses report    Chief Complaint   Patient presents with    Weight Gain      Present to ED from home  LOC: alert and orientated to name and place  Vital signs   Vitals:    10/20/23 2140 10/20/23 2240 10/20/23 2330 10/21/23 0040   BP: (!) 165/56 (!) 159/64  (!) 153/54   Pulse: 86 82 82 74   Resp: 18 20 18 20   Temp:       TempSrc:       SpO2: 98% 98% 96% 96%   Weight:       Height:          Oxygen Baseline 1L NC    Current needs required 1L NC   LDAs:   Peripheral IV 10/21/23 Left Antecubital (Active)   Site Assessment Clean, dry & intact 10/21/23 0036   Line Status Brisk blood return;Flushed 10/21/23 0036   Phlebitis Assessment No symptoms 10/21/23 0036   Infiltration Assessment 0 10/21/23 0036   Dressing Status Clean, dry & intact 10/21/23 0036   Dressing Type Transparent 10/21/23 0036     Mobility: Requires assistance * 1  Pending ED orders: None  Present condition: Stable      C-SSRS Risk of Suicide: No Risk  Swallow Screening    Preferred Language: [facility]     Electronically signed by Kadie Nye RN on 10/21/2023 at 12:58 AM       Kadie Nye, [address]  10/21/23 1461

## 2023-10-21 NOTE — ED NOTES
External catheter placed per [facility] request. Pt tolerated well. No further needs at this time. Call light within reach.      Traci Wray, LPN  51/51/44 7120

## 2023-10-21 NOTE — ED NOTES
Spoke to [employer] to approve pt transport to [phone] in stable condition via cot.      Kristy Estrella, ALEC  53/38/98 3682

## 2023-10-21 NOTE — ED NOTES
Patient resting in bed. Respirations easy and unlabored. No distress noted. Call light within reach.        Jessika Stinson RN  10/21/23 8117

## 2023-10-21 NOTE — ED NOTES
Pt resting in bed. Provided with additional blanket. Denies further needs. Call light within reach.      Marino Benites RN  10/20/23 8174

## 2023-10-21 NOTE — FLOWSHEET NOTE
10/21/23 1043   Safe Environment   Safety Measures Bed/Chair-Wheels locked; Bed/Chair alarm on;Call light within reach; Fall prevention (comment); Side rails X 2;Other (comment)  (VN Safety round)     Patient responds to VN appropriately, denies any immediate needs or concerns at this time. Fall prevention education reinforced at this time. Virtual to continue to round on and educate pt as appropriate.

## 2023-10-21 NOTE — ED NOTES
Pt IV infiltrated in CT; new IV established. Pt medicated per MAR. Repositioned in bed. Call light within reach.      Edis Ordaz RN  10/21/23 2975

## 2023-10-21 NOTE — ED NOTES
Patient resting in bed. Respirations easy and unlabored. No distress noted. Call light within reach.        Jesse Carl RN  10/21/23 6602

## 2023-10-21 NOTE — ED NOTES
Pt hypoglycemic; pt received dextrose IV per MAR, cecille crackers and peanut butter, orange juice, and [employer] and POCT glucose 79. Ailyn Fonseca NP notified.      Ziyad Moreno RN  10/20/23 1517

## 2023-10-21 NOTE — ED PROVIDER NOTES
SUCCINATE (TOPROL XL) 25 MG EXTENDED RELEASE TABLET    Take 0.5 tablets by mouth daily    MULTIPLE VITAMINS-MINERALS (THERAPEUTIC MULTIVITAMIN-MINERALS) TABLET    Take 1 tablet by mouth daily    OXYGEN    1L rest and 2L exertion    PANTOPRAZOLE (PROTONIX) 40 MG TABLET    TAKE 1 TABLET BY MOUTH DAILY  BEFORE BREAKFAST    POTASSIUM CHLORIDE (KLOR-CON) 10 MEQ EXTENDED RELEASE TABLET    Take 1 tablet by mouth daily    SPIRONOLACTONE (ALDACTONE) 25 MG TABLET    Take 1 tablet by mouth daily    VITAMIN E (YOHANA-PLUS E PO)    Take 800 Units by mouth daily       ALLERGIES     is allergic to codeine, meloxicam, pcn [penicillins], ceclor [cefaclor], ciprofloxacin, clindamycin/lincomycin, exenatide, hydralazine, indocin [indomethacin], levaquin [levofloxacin], levothyroxine, minocycline, norco [hydrocodone-acetaminophen], other, sulfa antibiotics, suprax [cefixime], tramadol, and metformin. FAMILY HISTORY     She indicated that her mother is . She indicated that her father is . family history includes Arthritis in her mother; Diabetes in her mother; Heart Disease in her father; High Cholesterol in her father and mother; Stroke in her father. SOCIAL HISTORY       Social History     Socioeconomic History    Marital status:       Spouse name: Not on file    Number of children: Not on file    Years of education: Not on file    Highest education level: Not on file   Occupational History    Not on file   Tobacco Use    Smoking status: Former     Types: Cigarettes     Quit date: 2003     Years since quittin.3    Smokeless tobacco: Never   Substance and Sexual Activity    Alcohol use: Yes     Comment: occassionally    Drug use: No    Sexual activity: Not on file   Other Topics Concern    Not on file   Social History Narrative    Not on file     Social Determinants of Health     Financial Resource Strain: Not on file   Food Insecurity: Not on file   Transportation Needs: Not on file   Physical

## 2023-10-21 NOTE — H&P
Internal Medicine  Resident History & Physical    Patient:  Selvin Chavez, 82 y.o. female  : 1940  Unit: A  MRN:  [id number]     Acct:  [de-identified]    PCP:  Aura Menard MD    Date of Admission: 10/20/2023    Date of Service: Patient seen / examined on 10/20/23 and admitted to Inpatient with expected length of stay greater than two midnights due to medical therapy. ASSESSMENT / PLAN:  Acute on chronic hypoxic respiratory failure. On 1L in ED. Secondary to HFpEF, COPD. 1L at rest, 2L at home per discharge 2023. Wean oxygen as tolerated, target SpO2 >90%. Acute metabolic encephalopathy. Patient alert and oriented x4 on interview. 3 week history of auditory, visual hallucinations overlaying underlying progressive cognitive decline. BMP bicarb 34, glucose on admission 61, LFT unremarkable, TSH 1.45. Hold home duloxetine  CPAP for AMANDEEP, consider BiPAP  Diuresis for acute CHF exacerbation  HFpEF EF 50% 2023. Echo 2012 shows G1 DD. . On bumex 1 daily, spirinolactone 25, toprol 25, lisinopril, jardiance, KCl. Pannus pitting edema, bibasilar rales on exam.  IV furosemide 40 BID  Compensated metabolic alkalosis. VBG pH 7.35, PCO2 60, BMP bicarb 34. Likely secondary to chronic respiratory failure, HFpEF. Moderate COPD. PFT 2018 FEV1 45%, FEV1/FVC ratio 74% [facility]. No acute cough, at baseline oxygen 1L at rest.   Duoneb trial  Obstructive sleep apnea. Patient using CPAP until 6 weeks ago when mask broke. Progressive increase in bicarb since then. CPAP 10 cm H2O intermittent  Type 2 diabetes mellitus. Glucose on admission 61 s/p glucose tablet in ED. Hypothyroidism. TSH 1.45. Continue home levothyroxine  Free T4  Paroxysmal atrial fibrillation. [id number] score 8. No anticoagulation due to repeated falls.   Continue metoprolol with hold parameters  Recommend Watchman  Hypertension  History of CVA

## 2023-10-22 PROBLEM — E87.3 METABOLIC ALKALOSIS: Status: ACTIVE | Noted: 2023-10-22

## 2023-10-22 PROBLEM — I50.31 ACUTE HEART FAILURE WITH PRESERVED EJECTION FRACTION (HCC): Status: ACTIVE | Noted: 2023-10-22

## 2023-10-22 LAB
ANION GAP SERPL CALC-SCNC: 9 MEQ/L (ref 8–16)
BUN SERPL-MCNC: 29 MG/DL (ref 7–22)
CALCIUM SERPL-MCNC: 9.9 MG/DL (ref 8.5–10.5)
CHLORIDE SERPL-SCNC: 103 MEQ/L (ref 98–111)
CO2 SERPL-SCNC: 28 MEQ/L (ref 23–33)
CREAT SERPL-MCNC: 1 MG/DL (ref 0.4–1.2)
DEPRECATED RDW RBC AUTO: 59.7 FL (ref 35–45)
ERYTHROCYTE [DISTWIDTH] IN BLOOD BY AUTOMATED COUNT: 17.7 % (ref 11.5–14.5)
GFR SERPL CREATININE-BSD FRML MDRD: 56 ML/MIN/1.73M2
GLUCOSE BLD STRIP.AUTO-MCNC: 178 MG/DL (ref 70–108)
GLUCOSE BLD STRIP.AUTO-MCNC: 190 MG/DL (ref 70–108)
GLUCOSE BLD STRIP.AUTO-MCNC: 201 MG/DL (ref 70–108)
GLUCOSE SERPL-MCNC: 141 MG/DL (ref 70–108)
HCT VFR BLD AUTO: 33.8 % (ref 37–47)
HGB BLD-MCNC: 9.4 GM/DL (ref 12–16)
MCH RBC QN AUTO: 25.9 PG (ref 26–33)
MCHC RBC AUTO-ENTMCNC: 27.8 GM/DL (ref 32.2–35.5)
MCV RBC AUTO: 93.1 FL (ref 81–99)
PLATELET # BLD AUTO: 300 THOU/MM3 (ref 130–400)
PMV BLD AUTO: 9.4 FL (ref 9.4–12.4)
POTASSIUM SERPL-SCNC: 4.9 MEQ/L (ref 3.5–5.2)
RBC # BLD AUTO: 3.63 MILL/MM3 (ref 4.2–5.4)
SODIUM SERPL-SCNC: 140 MEQ/L (ref 135–145)
WBC # BLD AUTO: 7.8 THOU/MM3 (ref 4.8–10.8)

## 2023-10-22 PROCEDURE — 6360000002 HC RX W HCPCS

## 2023-10-22 PROCEDURE — 6370000000 HC RX 637 (ALT 250 FOR IP): Performed by: INTERNAL MEDICINE

## 2023-10-22 PROCEDURE — 94640 AIRWAY INHALATION TREATMENT: CPT

## 2023-10-22 PROCEDURE — 82948 REAGENT STRIP/BLOOD GLUCOSE: CPT

## 2023-10-22 PROCEDURE — 2700000000 HC OXYGEN THERAPY PER DAY

## 2023-10-22 PROCEDURE — 85027 COMPLETE CBC AUTOMATED: CPT

## 2023-10-22 PROCEDURE — 36415 COLL VENOUS BLD VENIPUNCTURE: CPT

## 2023-10-22 PROCEDURE — 99232 SBSQ HOSP IP/OBS MODERATE 35: CPT | Performed by: INTERNAL MEDICINE

## 2023-10-22 PROCEDURE — 6370000000 HC RX 637 (ALT 250 FOR IP)

## 2023-10-22 PROCEDURE — 2580000003 HC RX 258

## 2023-10-22 PROCEDURE — 80048 BASIC METABOLIC PNL TOTAL CA: CPT

## 2023-10-22 PROCEDURE — 94761 N-INVAS EAR/PLS OXIMETRY MLT: CPT

## 2023-10-22 PROCEDURE — 1200000003 HC TELEMETRY R&B

## 2023-10-22 RX ORDER — INSULIN GLARGINE 100 [IU]/ML
15 INJECTION, SOLUTION SUBCUTANEOUS NIGHTLY
Status: DISCONTINUED | OUTPATIENT
Start: 2023-10-22 | End: 2023-10-24 | Stop reason: HOSPADM

## 2023-10-22 RX ORDER — INSULIN LISPRO 100 [IU]/ML
0-4 INJECTION, SOLUTION INTRAVENOUS; SUBCUTANEOUS
Status: DISCONTINUED | OUTPATIENT
Start: 2023-10-22 | End: 2023-10-24 | Stop reason: HOSPADM

## 2023-10-22 RX ORDER — IBUPROFEN 600 MG/1
1 TABLET ORAL PRN
Status: DISCONTINUED | OUTPATIENT
Start: 2023-10-22 | End: 2023-10-24 | Stop reason: HOSPADM

## 2023-10-22 RX ORDER — INSULIN LISPRO 100 [IU]/ML
0-4 INJECTION, SOLUTION INTRAVENOUS; SUBCUTANEOUS NIGHTLY
Status: DISCONTINUED | OUTPATIENT
Start: 2023-10-22 | End: 2023-10-24 | Stop reason: HOSPADM

## 2023-10-22 RX ORDER — SENNOSIDES A AND B 8.6 MG/1
1 TABLET, FILM COATED ORAL NIGHTLY
Status: DISCONTINUED | OUTPATIENT
Start: 2023-10-22 | End: 2023-10-24 | Stop reason: HOSPADM

## 2023-10-22 RX ORDER — DEXTROSE MONOHYDRATE 100 MG/ML
INJECTION, SOLUTION INTRAVENOUS CONTINUOUS PRN
Status: DISCONTINUED | OUTPATIENT
Start: 2023-10-22 | End: 2023-10-24 | Stop reason: HOSPADM

## 2023-10-22 RX ADMIN — ENOXAPARIN SODIUM 30 MG: 100 INJECTION SUBCUTANEOUS at 08:44

## 2023-10-22 RX ADMIN — LEVOTHYROXINE SODIUM 150 MCG: 0.05 TABLET ORAL at 06:27

## 2023-10-22 RX ADMIN — INSULIN GLARGINE 15 UNITS: 100 INJECTION, SOLUTION SUBCUTANEOUS at 20:11

## 2023-10-22 RX ADMIN — METOPROLOL SUCCINATE 12.5 MG: 25 TABLET, EXTENDED RELEASE ORAL at 08:44

## 2023-10-22 RX ADMIN — SODIUM CHLORIDE, PRESERVATIVE FREE 10 ML: 5 INJECTION INTRAVENOUS at 08:45

## 2023-10-22 RX ADMIN — SPIRONOLACTONE 25 MG: 25 TABLET ORAL at 08:44

## 2023-10-22 RX ADMIN — GABAPENTIN 300 MG: 300 CAPSULE ORAL at 08:44

## 2023-10-22 RX ADMIN — IPRATROPIUM BROMIDE AND ALBUTEROL SULFATE 1 DOSE: .5; 3 SOLUTION RESPIRATORY (INHALATION) at 09:18

## 2023-10-22 RX ADMIN — ENOXAPARIN SODIUM 30 MG: 100 INJECTION SUBCUTANEOUS at 20:11

## 2023-10-22 RX ADMIN — POTASSIUM CHLORIDE 10 MEQ: 750 TABLET, EXTENDED RELEASE ORAL at 08:44

## 2023-10-22 RX ADMIN — ATORVASTATIN CALCIUM 40 MG: 40 TABLET, FILM COATED ORAL at 20:11

## 2023-10-22 RX ADMIN — GABAPENTIN 300 MG: 300 CAPSULE ORAL at 20:11

## 2023-10-22 RX ADMIN — SENNOSIDES 8.6 MG: 8.6 TABLET, FILM COATED ORAL at 20:11

## 2023-10-22 RX ADMIN — SODIUM CHLORIDE, PRESERVATIVE FREE 10 ML: 5 INJECTION INTRAVENOUS at 20:10

## 2023-10-22 RX ADMIN — LISINOPRIL 5 MG: 5 TABLET ORAL at 08:44

## 2023-10-22 RX ADMIN — PANTOPRAZOLE SODIUM 40 MG: 40 TABLET, DELAYED RELEASE ORAL at 06:28

## 2023-10-22 RX ADMIN — GABAPENTIN 300 MG: 300 CAPSULE ORAL at 13:31

## 2023-10-22 RX ADMIN — ACETAMINOPHEN 650 MG: 325 TABLET ORAL at 11:05

## 2023-10-22 RX ADMIN — ASPIRIN 81 MG: 81 TABLET, COATED ORAL at 08:44

## 2023-10-22 ASSESSMENT — PAIN SCALES - GENERAL: PAINLEVEL_OUTOF10: 3

## 2023-10-22 ASSESSMENT — PAIN DESCRIPTION - LOCATION: LOCATION: SHOULDER

## 2023-10-22 ASSESSMENT — PAIN DESCRIPTION - ORIENTATION: ORIENTATION: LEFT;RIGHT

## 2023-10-22 ASSESSMENT — PAIN DESCRIPTION - DESCRIPTORS: DESCRIPTORS: ACHING;SORE

## 2023-10-22 NOTE — PLAN OF CARE
Problem: Respiratory - Adult  Goal: Achieves optimal ventilation and oxygenation  10/22/2023 0921 by José Manuel Mena RCP  Note: Patient receiving breathing treatments to maintain and manage respiratory status. Will be continued as ordered to improve aeration.

## 2023-10-22 NOTE — FLOWSHEET NOTE
10/22/23 2633   Safe Environment   Safety Measures Fall prevention (comment);Gripper socks; Other (comment)  (VN Safety round)     Patient responds to VN appropriately. Pt informed this VN she has to use BR. VN instructed pt to press call button, but pt could not find call light at this time. VN called charge nurse called and informed.

## 2023-10-22 NOTE — PLAN OF CARE
Problem: Discharge Planning  Goal: Discharge to home or other facility with appropriate resources  Outcome: Progressing  Flowsheets (Taken 10/21/2023 2024)  Discharge to home or other facility with appropriate resources: Identify barriers to discharge with patient and caregiver     Problem: Safety - Adult  Goal: Free from fall injury  Outcome: Progressing     Problem: Skin/Tissue Integrity  Goal: Absence of new skin breakdown  Description: 1. Monitor for areas of redness and/or skin breakdown  2. Assess vascular access sites hourly  3. Every 4-6 hours minimum:  Change oxygen saturation probe site  4. Every 4-6 hours:  If on nasal continuous positive airway pressure, respiratory therapy assess nares and determine need for appliance change or resting period.   Outcome: Progressing     Problem: ABCDS Injury Assessment  Goal: Absence of physical injury  Outcome: Progressing     Problem: Chronic Conditions and Co-morbidities  Goal: Patient's chronic conditions and co-morbidity symptoms are monitored and maintained or improved  Outcome: Progressing  Flowsheets (Taken 10/21/2023 2024)  Care Plan - Patient's Chronic Conditions and Co-Morbidity Symptoms are Monitored and Maintained or Improved: Monitor and assess patient's chronic conditions and comorbid symptoms for stability, deterioration, or improvement     Problem: Neurosensory - Adult  Goal: Achieves stable or improved neurological status  Outcome: Progressing  Flowsheets (Taken 10/21/2023 2024)  Achieves stable or improved neurological status:   Assess for and report changes in neurological status   Initiate measures to prevent increased intracranial pressure  Goal: Achieves maximal functionality and self care  Outcome: Progressing     Problem: Respiratory - Adult  Goal: Achieves optimal ventilation and oxygenation  10/21/2023 2101 by Chinmay Duque RN  Outcome: Progressing  Flowsheets (Taken 10/21/2023 2024)  Achieves optimal ventilation and oxygenation:

## 2023-10-22 NOTE — FLOWSHEET NOTE
10/22/23 0827   Safe Environment   Safety Measures Bed/Chair alarm on;Bed/Chair-Wheels locked; Bed in low position;Call light within reach; Side rails X 2;Other (comment)  (VN Safety round)     Patient responds to VN appropriately, denies any immediate needs or concerns at this time. Virtual to continue to round on and educate pt as appropriate.

## 2023-10-23 LAB
ANION GAP SERPL CALC-SCNC: 8 MEQ/L (ref 8–16)
BUN SERPL-MCNC: 28 MG/DL (ref 7–22)
CALCIUM SERPL-MCNC: 10 MG/DL (ref 8.5–10.5)
CHLORIDE SERPL-SCNC: 106 MEQ/L (ref 98–111)
CO2 SERPL-SCNC: 29 MEQ/L (ref 23–33)
CREAT SERPL-MCNC: 1 MG/DL (ref 0.4–1.2)
DEPRECATED RDW RBC AUTO: 60.8 FL (ref 35–45)
ERYTHROCYTE [DISTWIDTH] IN BLOOD BY AUTOMATED COUNT: 17.5 % (ref 11.5–14.5)
FERRITIN SERPL IA-MCNC: 20 NG/ML (ref 10–291)
FOLATE SERPL-MCNC: > 20 NG/ML (ref 4.8–24.2)
GFR SERPL CREATININE-BSD FRML MDRD: 56 ML/MIN/1.73M2
GLUCOSE BLD STRIP.AUTO-MCNC: 137 MG/DL (ref 70–108)
GLUCOSE BLD STRIP.AUTO-MCNC: 166 MG/DL (ref 70–108)
GLUCOSE BLD STRIP.AUTO-MCNC: 175 MG/DL (ref 70–108)
GLUCOSE BLD STRIP.AUTO-MCNC: 208 MG/DL (ref 70–108)
GLUCOSE SERPL-MCNC: 132 MG/DL (ref 70–108)
HCT VFR BLD AUTO: 32.9 % (ref 37–47)
HGB BLD-MCNC: 9.2 GM/DL (ref 12–16)
IRON SATN MFR SERPL: 7 % (ref 20–50)
IRON SERPL-MCNC: 31 UG/DL (ref 50–170)
MCH RBC QN AUTO: 26.4 PG (ref 26–33)
MCHC RBC AUTO-ENTMCNC: 28 GM/DL (ref 32.2–35.5)
MCV RBC AUTO: 94.3 FL (ref 81–99)
PLATELET # BLD AUTO: 306 THOU/MM3 (ref 130–400)
PMV BLD AUTO: 9.3 FL (ref 9.4–12.4)
POTASSIUM SERPL-SCNC: 5.1 MEQ/L (ref 3.5–5.2)
RBC # BLD AUTO: 3.49 MILL/MM3 (ref 4.2–5.4)
SODIUM SERPL-SCNC: 143 MEQ/L (ref 135–145)
TIBC SERPL-MCNC: 433 UG/DL (ref 171–450)
VIT B12 SERPL-MCNC: 612 PG/ML (ref 211–911)
WBC # BLD AUTO: 7.9 THOU/MM3 (ref 4.8–10.8)

## 2023-10-23 PROCEDURE — 94761 N-INVAS EAR/PLS OXIMETRY MLT: CPT

## 2023-10-23 PROCEDURE — 82728 ASSAY OF FERRITIN: CPT

## 2023-10-23 PROCEDURE — 83540 ASSAY OF IRON: CPT

## 2023-10-23 PROCEDURE — 6360000002 HC RX W HCPCS

## 2023-10-23 PROCEDURE — 6370000000 HC RX 637 (ALT 250 FOR IP)

## 2023-10-23 PROCEDURE — 82746 ASSAY OF FOLIC ACID SERUM: CPT

## 2023-10-23 PROCEDURE — 80048 BASIC METABOLIC PNL TOTAL CA: CPT

## 2023-10-23 PROCEDURE — 6370000000 HC RX 637 (ALT 250 FOR IP): Performed by: INTERNAL MEDICINE

## 2023-10-23 PROCEDURE — 94640 AIRWAY INHALATION TREATMENT: CPT

## 2023-10-23 PROCEDURE — 97110 THERAPEUTIC EXERCISES: CPT

## 2023-10-23 PROCEDURE — 36415 COLL VENOUS BLD VENIPUNCTURE: CPT

## 2023-10-23 PROCEDURE — 99232 SBSQ HOSP IP/OBS MODERATE 35: CPT | Performed by: INTERNAL MEDICINE

## 2023-10-23 PROCEDURE — 85027 COMPLETE CBC AUTOMATED: CPT

## 2023-10-23 PROCEDURE — 2700000000 HC OXYGEN THERAPY PER DAY

## 2023-10-23 PROCEDURE — 1200000003 HC TELEMETRY R&B

## 2023-10-23 PROCEDURE — 2580000003 HC RX 258

## 2023-10-23 PROCEDURE — 82948 REAGENT STRIP/BLOOD GLUCOSE: CPT

## 2023-10-23 PROCEDURE — 97162 PT EVAL MOD COMPLEX 30 MIN: CPT

## 2023-10-23 PROCEDURE — 83550 IRON BINDING TEST: CPT

## 2023-10-23 PROCEDURE — 97530 THERAPEUTIC ACTIVITIES: CPT

## 2023-10-23 PROCEDURE — 82607 VITAMIN B-12: CPT

## 2023-10-23 RX ORDER — POLYETHYLENE GLYCOL 3350 17 G/17G
17 POWDER, FOR SOLUTION ORAL DAILY
Status: DISCONTINUED | OUTPATIENT
Start: 2023-10-24 | End: 2023-10-24 | Stop reason: HOSPADM

## 2023-10-23 RX ADMIN — GABAPENTIN 300 MG: 300 CAPSULE ORAL at 08:12

## 2023-10-23 RX ADMIN — LEVOTHYROXINE SODIUM 100 MCG: 0.05 TABLET ORAL at 05:32

## 2023-10-23 RX ADMIN — BUMETANIDE 1 MG: 1 TABLET ORAL at 08:12

## 2023-10-23 RX ADMIN — ENOXAPARIN SODIUM 30 MG: 100 INJECTION SUBCUTANEOUS at 08:08

## 2023-10-23 RX ADMIN — ATORVASTATIN CALCIUM 40 MG: 40 TABLET, FILM COATED ORAL at 20:44

## 2023-10-23 RX ADMIN — SODIUM CHLORIDE, PRESERVATIVE FREE 10 ML: 5 INJECTION INTRAVENOUS at 20:49

## 2023-10-23 RX ADMIN — METOPROLOL SUCCINATE 12.5 MG: 25 TABLET, EXTENDED RELEASE ORAL at 08:12

## 2023-10-23 RX ADMIN — IPRATROPIUM BROMIDE AND ALBUTEROL SULFATE 1 DOSE: .5; 3 SOLUTION RESPIRATORY (INHALATION) at 17:59

## 2023-10-23 RX ADMIN — ASPIRIN 81 MG: 81 TABLET, COATED ORAL at 08:12

## 2023-10-23 RX ADMIN — ENOXAPARIN SODIUM 30 MG: 100 INJECTION SUBCUTANEOUS at 20:45

## 2023-10-23 RX ADMIN — POLYETHYLENE GLYCOL 3350 17 G: 17 POWDER, FOR SOLUTION ORAL at 08:12

## 2023-10-23 RX ADMIN — ACETAMINOPHEN 650 MG: 325 TABLET ORAL at 08:12

## 2023-10-23 RX ADMIN — SENNOSIDES 8.6 MG: 8.6 TABLET, FILM COATED ORAL at 20:44

## 2023-10-23 RX ADMIN — INSULIN GLARGINE 15 UNITS: 100 INJECTION, SOLUTION SUBCUTANEOUS at 20:45

## 2023-10-23 RX ADMIN — POTASSIUM CHLORIDE 10 MEQ: 750 TABLET, EXTENDED RELEASE ORAL at 08:12

## 2023-10-23 RX ADMIN — LISINOPRIL 5 MG: 5 TABLET ORAL at 08:12

## 2023-10-23 RX ADMIN — GABAPENTIN 300 MG: 300 CAPSULE ORAL at 20:44

## 2023-10-23 RX ADMIN — PANTOPRAZOLE SODIUM 40 MG: 40 TABLET, DELAYED RELEASE ORAL at 05:32

## 2023-10-23 RX ADMIN — SODIUM CHLORIDE, PRESERVATIVE FREE 10 ML: 5 INJECTION INTRAVENOUS at 08:08

## 2023-10-23 RX ADMIN — IPRATROPIUM BROMIDE AND ALBUTEROL SULFATE 1 DOSE: .5; 3 SOLUTION RESPIRATORY (INHALATION) at 09:09

## 2023-10-23 RX ADMIN — ACETAMINOPHEN 650 MG: 325 TABLET ORAL at 22:19

## 2023-10-23 RX ADMIN — GABAPENTIN 300 MG: 300 CAPSULE ORAL at 14:24

## 2023-10-23 RX ADMIN — SPIRONOLACTONE 25 MG: 25 TABLET ORAL at 08:12

## 2023-10-23 ASSESSMENT — PAIN DESCRIPTION - ORIENTATION
ORIENTATION: RIGHT;LEFT
ORIENTATION: RIGHT;LEFT

## 2023-10-23 ASSESSMENT — PAIN DESCRIPTION - DESCRIPTORS
DESCRIPTORS: ACHING;SORE
DESCRIPTORS: ACHING

## 2023-10-23 ASSESSMENT — PAIN SCALES - GENERAL
PAINLEVEL_OUTOF10: 3
PAINLEVEL_OUTOF10: 3

## 2023-10-23 ASSESSMENT — PAIN SCALES - WONG BAKER: WONGBAKER_NUMERICALRESPONSE: 0

## 2023-10-23 ASSESSMENT — PAIN DESCRIPTION - LOCATION
LOCATION: BUTTOCKS;SHOULDER
LOCATION: SHOULDER

## 2023-10-23 ASSESSMENT — PAIN - FUNCTIONAL ASSESSMENT: PAIN_FUNCTIONAL_ASSESSMENT: PREVENTS OR INTERFERES SOME ACTIVE ACTIVITIES AND ADLS

## 2023-10-23 NOTE — PLAN OF CARE
Problem: Discharge Planning  Goal: Discharge to home or other facility with appropriate resources  Outcome: Progressing  Flowsheets (Taken 10/22/2023 2006)  Discharge to home or other facility with appropriate resources: Identify barriers to discharge with patient and caregiver     Problem: Safety - Adult  Goal: Free from fall injury  Outcome: Progressing     Problem: Skin/Tissue Integrity  Goal: Absence of new skin breakdown  Description: 1. Monitor for areas of redness and/or skin breakdown  2. Assess vascular access sites hourly  3. Every 4-6 hours minimum:  Change oxygen saturation probe site  4. Every 4-6 hours:  If on nasal continuous positive airway pressure, respiratory therapy assess nares and determine need for appliance change or resting period.   Outcome: Progressing     Problem: ABCDS Injury Assessment  Goal: Absence of physical injury  Outcome: Progressing     Problem: Chronic Conditions and Co-morbidities  Goal: Patient's chronic conditions and co-morbidity symptoms are monitored and maintained or improved  Outcome: Progressing  Flowsheets (Taken 10/22/2023 2006)  Care Plan - Patient's Chronic Conditions and Co-Morbidity Symptoms are Monitored and Maintained or Improved: Monitor and assess patient's chronic conditions and comorbid symptoms for stability, deterioration, or improvement     Problem: Neurosensory - Adult  Goal: Achieves stable or improved neurological status  Outcome: Progressing  Flowsheets (Taken 10/22/2023 2006)  Achieves stable or improved neurological status: Assess for and report changes in neurological status  Goal: Achieves maximal functionality and self care  Outcome: Progressing     Problem: Respiratory - Adult  Goal: Achieves optimal ventilation and oxygenation  10/22/2023 2016 by Chinmay Duque RN  Outcome: Progressing  Flowsheets (Taken 10/22/2023 2006)  Achieves optimal ventilation and oxygenation: Oxygen supplementation based on oxygen saturation or arterial blood

## 2023-10-23 NOTE — PLAN OF CARE
Problem: Discharge Planning  Goal: Discharge to home or other facility with appropriate resources  Outcome: Progressing  Flowsheets (Taken 10/23/2023 1314)  Discharge to home or other facility with appropriate resources: Identify barriers to discharge with patient and caregiver  Note: Return home with current [facility], see  notes 10/23/23.

## 2023-10-23 NOTE — FLOWSHEET NOTE
10/23/23 6066   Safe Environment   Safety Measures Standard Safety Measures;Call light within reach  (virtual safety round)     Pt did not respond to voice, camera turned on for safety. Pt up in chair. No apparent signs of distress.

## 2023-10-23 NOTE — FLOWSHEET NOTE
10/23/23 1116   Safe Environment   Safety Measures Nurse at bedside;Standard Safety Measures  (virtual safety round attempted)     Virtual nurse attempted safety round with patient; bedside nurse in room assisting at the moment; camera was not turned on for patient's privacy, patient is safe with staff. Thank you.

## 2023-10-23 NOTE — CARE COORDINATION
10/23/23, 1:11 PM EDT    DISCHARGE PLANNING EVALUATION       Patient is current with [facility] and prefers to continue with them at discharge. SW spoke with son who denied need for any other services at home. Daughter Liliana Swartz is there during the daytime hours for meals and household chores. Liliana Swartz will transport Patient at discharge. ORALIA verified Patient is current with [facility] for skilled RN only at this time.

## 2023-10-23 NOTE — PLAN OF CARE
Has CPAP machine, continue home settings    No Humidifier needed, mask only    No tubing needed at this time    Full Face Mask 1 per 3 months and Cushions/Seals 1 per month    Headgear 1 per 6 months, Chin Strap 1 per 6 months, Disposable Filters 2 per month, Non-disposable Filters 1 per 6 months, Chambers 1 per 6 months and Other Related Supplies. Replace supplies and accessories as needed. Patient may choose another interface for compliance and comfort.   Comments: Pt mask broke, needs replacement  Diagnosis: AMANDEEP  Length of need: Lifetime      Raisa Monroe, DO

## 2023-10-24 VITALS
RESPIRATION RATE: 17 BRPM | DIASTOLIC BLOOD PRESSURE: 43 MMHG | WEIGHT: 240 LBS | BODY MASS INDEX: 47.12 KG/M2 | SYSTOLIC BLOOD PRESSURE: 151 MMHG | OXYGEN SATURATION: 97 % | TEMPERATURE: 97.9 F | HEART RATE: 69 BPM | HEIGHT: 60 IN

## 2023-10-24 LAB
GLUCOSE BLD STRIP.AUTO-MCNC: 127 MG/DL (ref 70–108)
GLUCOSE BLD STRIP.AUTO-MCNC: 171 MG/DL (ref 70–108)

## 2023-10-24 PROCEDURE — 97116 GAIT TRAINING THERAPY: CPT

## 2023-10-24 PROCEDURE — 99239 HOSP IP/OBS DSCHRG MGMT >30: CPT | Performed by: INTERNAL MEDICINE

## 2023-10-24 PROCEDURE — 97110 THERAPEUTIC EXERCISES: CPT

## 2023-10-24 PROCEDURE — 6370000000 HC RX 637 (ALT 250 FOR IP): Performed by: INTERNAL MEDICINE

## 2023-10-24 PROCEDURE — 6360000002 HC RX W HCPCS

## 2023-10-24 PROCEDURE — 94640 AIRWAY INHALATION TREATMENT: CPT

## 2023-10-24 PROCEDURE — 6370000000 HC RX 637 (ALT 250 FOR IP)

## 2023-10-24 PROCEDURE — 94761 N-INVAS EAR/PLS OXIMETRY MLT: CPT

## 2023-10-24 PROCEDURE — 2700000000 HC OXYGEN THERAPY PER DAY

## 2023-10-24 PROCEDURE — 82948 REAGENT STRIP/BLOOD GLUCOSE: CPT

## 2023-10-24 PROCEDURE — 2580000003 HC RX 258

## 2023-10-24 RX ORDER — INSULIN DETEMIR 100 [IU]/ML
15 INJECTION, SOLUTION SUBCUTANEOUS NIGHTLY
Qty: 3 EACH | Refills: 1 | Status: SHIPPED | OUTPATIENT
Start: 2023-10-24

## 2023-10-24 RX ORDER — FERROUS SULFATE 325(65) MG
325 TABLET ORAL EVERY OTHER DAY
Qty: 45 TABLET | Refills: 1 | Status: SHIPPED | OUTPATIENT
Start: 2023-10-24

## 2023-10-24 RX ORDER — INSULIN LISPRO 200 [IU]/ML
4 INJECTION, SOLUTION SUBCUTANEOUS
Qty: 2 ADJUSTABLE DOSE PRE-FILLED PEN SYRINGE | Refills: 1 | Status: SHIPPED | OUTPATIENT
Start: 2023-10-24

## 2023-10-24 RX ADMIN — GABAPENTIN 300 MG: 300 CAPSULE ORAL at 14:23

## 2023-10-24 RX ADMIN — POLYETHYLENE GLYCOL 3350 17 G: 17 POWDER, FOR SOLUTION ORAL at 09:53

## 2023-10-24 RX ADMIN — SPIRONOLACTONE 25 MG: 25 TABLET ORAL at 09:54

## 2023-10-24 RX ADMIN — GABAPENTIN 300 MG: 300 CAPSULE ORAL at 09:54

## 2023-10-24 RX ADMIN — ENOXAPARIN SODIUM 30 MG: 100 INJECTION SUBCUTANEOUS at 09:54

## 2023-10-24 RX ADMIN — IPRATROPIUM BROMIDE AND ALBUTEROL SULFATE 1 DOSE: .5; 3 SOLUTION RESPIRATORY (INHALATION) at 08:54

## 2023-10-24 RX ADMIN — POTASSIUM CHLORIDE 10 MEQ: 750 TABLET, EXTENDED RELEASE ORAL at 09:54

## 2023-10-24 RX ADMIN — ASPIRIN 81 MG: 81 TABLET, COATED ORAL at 09:54

## 2023-10-24 RX ADMIN — SODIUM CHLORIDE, PRESERVATIVE FREE 10 ML: 5 INJECTION INTRAVENOUS at 09:54

## 2023-10-24 RX ADMIN — BUMETANIDE 1 MG: 1 TABLET ORAL at 09:54

## 2023-10-24 RX ADMIN — PANTOPRAZOLE SODIUM 40 MG: 40 TABLET, DELAYED RELEASE ORAL at 05:15

## 2023-10-24 RX ADMIN — LEVOTHYROXINE SODIUM 100 MCG: 0.05 TABLET ORAL at 05:15

## 2023-10-24 ASSESSMENT — PAIN DESCRIPTION - LOCATION: LOCATION: BACK;NOSE

## 2023-10-24 ASSESSMENT — PAIN SCALES - GENERAL: PAINLEVEL_OUTOF10: 7

## 2023-10-24 NOTE — DISCHARGE INSTRUCTIONS
[facility] insulin regiment as follow: Take Humalog 4 units daily with meals. Take Long acting Insulin 15 units nightly. Please check glucose prior to meals as well as a couple hours afterwards and write them down. Bring them to your PCP during next appointment. Can adjust at that time if necessary.      Follow up with PCP in 1 week

## 2023-10-24 NOTE — PLAN OF CARE
Problem: Respiratory - Adult  Goal: Clear lung sounds  10/24/2023 0858 by John Hoang RCP  Outcome: Progressing   Duoneb to improve breath sounds. Patient mutually agreed on goals.

## 2023-10-24 NOTE — PALLIATIVE CARE
Follow Up / Progress Note        Patient:   Rosanne Molina  YOB: 1940  Age:  82 y.o. Room:  [facility]17/017-A  MRN:  [id number]               Plan/Follow-Up:  Received patient's medical POA and living will. Copy placed in chart cubby and copy faxed to medical records.           Electronically signed by Marisol Vaughn RN on 10/24/2023 at 11:31 AM             Palliative Care Office: [phone]
Follow Up / Progress Note        Patient:   Sara Sams  YOB: 1940  Age:  82 y.o. Room:  [address] [place] [address]  MRN:  [id number]               Plan/Follow-Up:  Phone call made to Richard Wilson as patient's advance directives have not yet been received. They will email the advance directives today.          Electronically signed by Andi Clement RN on 10/24/2023 at 9:08 AM             Palliative Care Office: [phone]
the one she had previously. Patient asks this RN to call her daughter Rose Mary Mancilla to update her about this conversation. Emotional support provided. Plan/Follow-Up:-    9035  Phone call made to patient's daughter, Rose Mary Mancilla. Updated Rose Marysweetie Mancilla as to the conversation with the patient. Mar Rings understanding of her mother's wishes regarding DNR CCA and Rose Marysweetie Mancilla indicates that the patient has a DNR form at home. Rose Mary Mancilla is asked about medical POA and Rose Mary Mancilla is also indicating that the patient likely has an updated medical POA. Rose Mary Mancilla asks this RN to call her brother as this RN recommends new paperwork if medical POA has not been updated. 1213  Phone call made to Josué Hamm and he states that the patient has had financial and medical paperwork completed this summer by their  Tali Escoabr. Josué Hamm provides this RN with Heather's phone number and he asks that this RN call to request a copy. 1230  Phone call made to Tali Escobar,  and request made for patient's medical POA and living will. Tali Escobar will email a copy to this RN.    4657 Text message sent to Dr. Liliane Abbasi for code status change to DNR CCA and for signature on [place] DNR form. Palliative care will continue to follow and assist as appropriate. Staff may call prn.            Electronically signed by Chavo Calderon RN on 10/23/2023 at [address] Office: [phone]

## 2023-10-24 NOTE — CARE COORDINATION
Pt verbalized understanding and gave permission for possible discharge within 4 hours of receiving IMM.         IMM Letter  IMM Letter given to Patient/Family/Significant other/Guardian/POA/by[de-identified] Akosua Berger CM  IMM Letter date given[de-identified] 10/24/23  IMM Letter time given[de-identified] 2456

## 2023-10-24 NOTE — DISCHARGE SUMMARY
calcifications without aneurysm. This document has been electronically signed by: Jeff Avelar MD on    10/21/2023 12:55 AM      All CTs at this facility use dose modulation techniques and iterative    reconstructions, and/or weight-based dosing   when appropriate to reduce radiation to a low as reasonably achievable. CT HEAD WO CONTRAST   Final Result   1. No acute intracranial findings      This document has been electronically signed by: Helder Capellan MD on    10/20/2023 08:35 PM      All CTs at this facility use dose modulation techniques and iterative    reconstructions, and/or weight-based dosing   when appropriate to reduce radiation to a low as reasonably achievable. XR CHEST (2 VW)   Final Result   1. No acute findings. This document has been electronically signed by: Helder Capellan MD on    10/20/2023 06:14 PM             Consults:   [address] [place] [address] Drive TO \[facility]\"" SERVICES  IP CONSULT TO SOCIAL WORK  IP CONSULT TO CASE MANAGEMENT  IP CONSULT TO SOCIAL WORK    Disposition: Home  Condition at Discharge: Stable    Code Status:  DNR-CCA     Patient Instructions:    Discharge lab work: none  Activity: activity as tolerated  Diet: ADULT DIET; Regular; 4 carb choices (60 gm/meal);  Low Sodium (2 gm); 2000 ml      Follow-up visits:   [address] [place] [address] So. Venus Thompson MD  [address]  [phone]    Follow up in 1 week(s)           Discharge Medications:      Medication List        ASK your doctor about these medications      ACETAMINOPHEN EXTRA STRENGTH PO     aspirin EC 81 MG EC tablet  Take 1 tablet by mouth daily     atorvastatin 40 MG tablet  Commonly known as: LIPITOR  Take 1 tablet by mouth daily     bumetanide 1 MG tablet  Commonly known as: BUMEX  Take 1 tablet by mouth daily     CoQ10 100 MG Caps     DULoxetine 30 MG extended release capsule  Commonly known as: CYMBALTA  Take
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
## 2023-10-24 NOTE — FLOWSHEET NOTE
10/24/23 1033   Safe Environment   Safety Measures Nurse at bedside;Standard Safety Measures  (Virtual Nurse Safety Round Complete)     Call placed to patients room, patient did not respond to audio as she could not hear it and student nurse assisted in communicating with patient. Video turned on with permission. Patient is resting in chair call light within reach, no signs or symtpoms of distress noted.

## 2023-10-24 NOTE — PLAN OF CARE
Problem: Discharge Planning  Goal: Discharge to home or other facility with appropriate resources  10/24/2023 0135 by Darien Stoner RN  Outcome: Progressing  Flowsheets (Taken 10/24/2023 0135)  Discharge to home or other facility with appropriate resources:   Identify barriers to discharge with patient and caregiver   Arrange for needed discharge resources and transportation as appropriate   Identify discharge learning needs (meds, wound care, etc)    Problem: Safety - Adult  Goal: Free from fall injury  Outcome: Progressing  Flowsheets (Taken 10/24/2023 0135)  Free From Fall Injury: Instruct family/caregiver on patient safety     Problem: Skin/Tissue Integrity  Goal: Absence of new skin breakdown  Description: 1. Monitor for areas of redness and/or skin breakdown  2. Assess vascular access sites hourly  3. Every 4-6 hours minimum:  Change oxygen saturation probe site  4. Every 4-6 hours:  If on nasal continuous positive airway pressure, respiratory therapy assess nares and determine need for appliance change or resting period.   Outcome: Progressing     Problem: ABCDS Injury Assessment  Goal: Absence of physical injury  Outcome: Progressing  Flowsheets (Taken 10/24/2023 0135)  Absence of Physical Injury: Implement safety measures based on patient assessment     Problem: Chronic Conditions and Co-morbidities  Goal: Patient's chronic conditions and co-morbidity symptoms are monitored and maintained or improved  Outcome: Progressing  Flowsheets (Taken 10/24/2023 0135)  Care Plan - Patient's Chronic Conditions and Co-Morbidity Symptoms are Monitored and Maintained or Improved:   Monitor and assess patient's chronic conditions and comorbid symptoms for stability, deterioration, or improvement   Collaborate with multidisciplinary team to address chronic and comorbid conditions and prevent exacerbation or deterioration   Update acute care plan with appropriate goals if chronic or comorbid symptoms are exacerbated

## 2023-10-25 ENCOUNTER — CARE COORDINATION (OUTPATIENT)
Dept: CARE COORDINATION | Age: 83
End: 2023-10-25

## 2023-10-25 ENCOUNTER — CARE COORDINATION (OUTPATIENT)
Dept: CASE MANAGEMENT | Age: 83
End: 2023-10-25

## 2023-10-25 DIAGNOSIS — R41.0 DELIRIUM: Primary | ICD-10-CM

## 2023-10-25 LAB
BACTERIA BLD AEROBE CULT: NORMAL
BACTERIA BLD AEROBE CULT: NORMAL

## 2023-10-25 PROCEDURE — 1111F DSCHRG MED/CURRENT MED MERGE: CPT | Performed by: FAMILY MEDICINE

## 2023-10-25 NOTE — CARE COORDINATION
Care Transitions Initial Follow Up Call    Call within 2 business days of discharge: Yes    Patient Current Location:  Home: [facility]    Care Transition Nurse contacted the family by telephone to perform post hospital discharge assessment. Verified name and  with family as identifiers. Provided introduction to self, and explanation of the Care Transition Nurse role. Patient: Deborah Wan Patient : 1940   MRN: [id number]  Reason for Admission: Delirium  Discharge Date: 10/24/23 RARS: Readmission Risk Score: 23.9      Last Discharge Facility       Date Complaint Diagnosis Description Type Department Provider    10/20/23 Weight Gain Delirium ED to Hosp-Admission (Discharged) (ADMITTED) [facility] 6K Clinton Hernandez MD; Betsey Sanchez. .. Challenges to be reviewed by the provider   Additional needs identified to be addressed with provider: No  none               Method of communication with provider: none. Spoke with dtr, [place] N[place] with Tamika Balls on speaker. Said she is doing good today. Her delirium has resolved. Denies chest pain, SOB/DE LA TORRE, dizziness, headache. Used her new C-pap mask last night and slept well. O2 was on 2L all night, pulse ox 98%. Usually on 1 L, Vinay Jensen turned down and pulse ox 94-95%. Discussed bumping up to 2L with exertion if needed to keep pulse ox above 90%.  this morning. Vinay Jensen is trying to prepare meals/snacks and asked how many carbs she could have per meal.  According to dietician note, 60 gm/meal.  Offered RD, accepted. Reviewed medications/changes. Discussed that the new iron every other day could cause constipation and darker stools. Has hx of constipation, suggested she take a laxative with the iron but if stools become loose, back off of laxative, voiced understanding. Insulin was changed to Levemir at night.   Instructed on the importance of weighing daily, in the morning after urinating, same amount of clothing and that

## 2023-10-25 NOTE — CARE COORDINATION
patient's current nutrition recommendations include ADULT DIET; Regular; 4 carb choices (60 gm/meal); Low Sodium (2 gm); 2000 ml. Joshua Barajas is tracking patient's carbohydrates per meal and daily sodium intake. RD explained the importance of having a consistent carbohydrate intake throughout the day to help keep blood sugars steady, avoiding spikes and dips. Reviewed which foods contain carbohydrates and which foods do not contain carbohydrates. Explained carbohydrates are the main source of fuel for our bodies and the importance of choosing healthy sources such as whole grains, fruits and vegetables. Explained carbohydrates in food raise blood glucose and the importance of having balanced meals/snacks to help keep blood sugar steady. Discussed how eating a carbohydrate alone such as a banana versus a banana with peanut butter will affect blood sugar differently. Explained the components of a balanced meal using the MyPlate ABGANKYNH-9/1 plate fruits and/or vegetables, 1/4 plate protein and 1/4 plate starchy carbohydrates with 8 oz glass of low fat milk if desired. RD recommended browsing DM food hub for meal ideas and recipes. Explained the importance of incorporating a protein source to each meal and snack. Reviewed protein sources and provided examples. Joshua Barajas specifically asked about Thanksgiving coming up and states patient loves sweets. RD discussed portion control and the importance of incorporating each component of the plate especially a protein source. Reiterated the importance of taking medicine as directed and checking BS daily. Joshua Barajas states patient is checking her FBS daily and this AM  mg/dL. RD noted patient's current A1C is 6.4% as of 10/21/23. RD explained the importance of watching sodium to prevent body from holding extra fluid. RD explained the nutrition plan for heart failure usually limits the sodium from food and beverages to no more than 2000 mg per day.  Discussed avoiding the salt shaker

## 2023-10-26 PROBLEM — I50.22 CHRONIC SYSTOLIC (CONGESTIVE) HEART FAILURE (HCC): Status: ACTIVE | Noted: 2023-10-26

## 2023-10-31 ENCOUNTER — CARE COORDINATION (OUTPATIENT)
Dept: CASE MANAGEMENT | Age: 83
End: 2023-10-31

## 2023-10-31 NOTE — CARE COORDINATION
Care Transitions Follow Up Call    Patient Current Location:  Home:  [address] [address]    Care Transition Nurse contacted the patient and family by telephone to follow up after admission on 10/20/23. Verified name and  with patient and family as identifiers. Patient: Lobito Gomez  Patient : 1940   MRN: [id number]  Reason for Admission: AMS, wt. gain  Discharge Date: 10/24/23 RARS: Readmission Risk Score: 23.9      Needs to be reviewed by the provider   Additional needs identified to be addressed with provider: No  none             Method of communication with provider: none. CTN call to Deisy Ramirez (daughter on HIPAA) today and she says Penny Arreaga is feeling ok. Derrel Whitley is present for the call. C/o having trouble wearing CPAP @ hs. They scheduled an appointment w/   [facility] 23 to review instructions. O2 1-2 l/min cont nc pO2=>90%  Wt.=226 slight swelling ankles. Wearing compression socks during the day, off at hs. Keeps legs elevated when sitting. Has lift recliner. Ambulates to the  w/ walker w/o any problem. Deisy Ramirez stays w/ her 9-5 daily. PCP HFU 10/27/23-> no changes, ordered power scooter. Denies sob, inc. Swelling LE/abdomen, n/v/d, fever, chills, dizziness, malaise, AMS. BLOOD SUGAR=140  Eating, drinking & sleeping ok. Denies problems w/ urination/bowels. Takes stool softener prn since [facility] Iron. [facility] active spoke w/ Deisy James 10/25/23. No other concerns voiced at this time. Will continue to follow. Addressed changes since last contact:  home health care-[facility] active  Discussed follow-up appointments. If no appointment was previously scheduled, appointment scheduling offered: Yes. Is follow up appointment scheduled within 7 days of discharge? Yes.     Follow Up  Future Appointments   Date Time Provider [address]00  [facility]   2023  8:15 AM Franny Lara, P [address]     External follow up appointment(s):     Care Transition

## 2023-11-02 ENCOUNTER — CARE COORDINATION (OUTPATIENT)
Dept: CARE COORDINATION | Age: 83
End: 2023-11-02

## 2023-11-02 NOTE — CARE COORDINATION
Per Opal Arriaga RDN dietician education mailed to patient.         [address] [facility]   Medication Assistance  [address] [place] [address] [employer]    X) [phone] (W) [phone]

## 2023-11-03 ENCOUNTER — CARE COORDINATION (OUTPATIENT)
Dept: CASE MANAGEMENT | Age: 83
End: 2023-11-03

## 2023-11-03 NOTE — CARE COORDINATION
Care Transitions Outreach Attempt-1st attempt    Call within 2 business days of discharge: Yes   Attempted to reach patient for subsequent transitional call. Left HIPPA compliant VM to return call directly to [phone]. Patient: Kellie Miranda Patient : 1940 MRN: [id number]    Last Discharge Facility       Date Complaint Diagnosis Description Type Department Provider    10/20/23 Weight Gain Delirium ED to Hosp-Admission (Discharged) (ADMITTED) KEMAR Page MD; Mitesh Chambers. .. Noted following upcoming appointments from discharge chart review:   [facility] follow up appointment(s): No future appointments.   Non-[facility]  follow up appointment(s): na

## 2023-11-06 ENCOUNTER — CARE COORDINATION (OUTPATIENT)
Dept: CASE MANAGEMENT | Age: 83
End: 2023-11-06

## 2023-11-06 NOTE — CARE COORDINATION
Care Transitions Outreach Attempt    Call within 2 business days of discharge: Yes   Attempted to reach patient for transitions of care follow up. Unable to reach patient. Unable to reach daughter Britni Washburn x 2. Patient: Sylvia Gardner Patient : 1940 MRN: [id number]    Last Discharge Facility       Date Complaint Diagnosis Description Type Department Provider    10/20/23 Weight Gain Delirium ED to Hosp-Admission (Discharged) (ADMITTED) [facility] 6K Kiesha Britt MD; Tosin Tello. .. Was this an external facility discharge? No Discharge Facility Name:     Noted following upcoming appointments from discharge chart review:   [address] Natasha Mohamud [facility],[address] follow up appointment(s): No future appointments.     follow up appointment(s):

## 2023-11-07 ENCOUNTER — CARE COORDINATION (OUTPATIENT)
Dept: CASE MANAGEMENT | Age: 83
End: 2023-11-07

## 2023-11-07 NOTE — CARE COORDINATION
Hui Fortune (daughter) answered call saying she was on the other line talking to Medicare about her mother and will return CTN call when done.

## 2023-11-08 ENCOUNTER — CARE COORDINATION (OUTPATIENT)
Dept: CASE MANAGEMENT | Age: 83
End: 2023-11-08

## 2023-11-08 NOTE — CARE COORDINATION
Care Transitions Follow Up Call    Patient Current Location:  Home:  [address] [address]    Care Transition Nurse contacted the patient by telephone to follow up after admission on 10/20/23. Verified name and  with patient as identifiers. Patient: Shu Crowley  Patient : 1940   MRN: [id number]  Reason for Admission: AMS, wt. gain  Discharge Date: 10/24/23 RARS: Readmission Risk Score: 23.9      Needs to be reviewed by the provider   Additional needs identified to be addressed with provider: No  none             Method of communication with provider: none. Incoming call from [facility] from yesterday. Said she is feeling ok. She is using the C-pap, still gets a red face and has tightened it as much as she could. Does feel rested in the morning. Janet Shirleyver is following, saw last week. Continues using O2 1-2 L, pulse ox above 90%. Denies wt gain, has chronic swelling of ankles, wears compression hose during the day, elevates legs when sitting. Denies chest pain, increased SOB/DE LA TORRE. BS have been stable. Her dtr stays with her during the day. Eating, drinking, sleeping good. B&B normal.  HH continues to follow. No other issues to report. Denies any other needs. No other questions or concerns at this time. Will continue to follow. Addressed changes since last contact:  none  Discussed follow-up appointments. If no appointment was previously scheduled, appointment scheduling offered: Yes. Is follow up appointment scheduled within 7 days of discharge? Yes. Follow Up  No future appointments. External follow up appointment(s): carmine    Care Transition Nurse reviewed medical action plan and red flags with patient and discussed any barriers to care and/or understanding of plan of care after discharge. Discussed appropriate site of care based on symptoms and resources available to patient including: PCP  Specialist  Urgent care clinics  [place] [facility]  When to call 911.  The patient

## 2023-11-14 ENCOUNTER — CARE COORDINATION (OUTPATIENT)
Dept: CASE MANAGEMENT | Age: 83
End: 2023-11-14

## 2023-11-14 NOTE — CARE COORDINATION
Care Transitions Outreach Attempt    Call within 2 business days of discharge: Yes   Attempted to reach patient for transitions of care follow up. Unable to reach patient. Left HIPAA compliant message w/ CTN # to return call w/ no response day #1. Patient: Rosanne Molina Patient : 1940 MRN: [id number]    Last Discharge Facility       Date Complaint Diagnosis Description Type Department Provider    10/20/23 Weight Gain Delirium ED to Hosp-Admission (Discharged) (ADMITTED) [facility] 6K Alejandrina Parks MD; Tad Adhikari. .. Was this an external facility discharge? No Discharge Facility Name:     Noted following upcoming appointments from discharge chart review:   [address] Natasha Mohamud [facility],[address] follow up appointment(s): No future appointments.     follow up appointment(s):

## 2023-11-15 ENCOUNTER — CARE COORDINATION (OUTPATIENT)
Dept: CASE MANAGEMENT | Age: 83
End: 2023-11-15

## 2023-11-15 NOTE — CARE COORDINATION
Care Transitions Outreach Attempt-2nd attempt    Call within 2 business days of discharge: Yes   Attempted to reach patient for subsequent transitional call. Left HIPPA compliant VM to return call directly to [phone]. If no return call, CTN will sign off-2nd attempt. Unable to reach letter not sent, end of program next week. Patient: Gregoria Tapia Patient : 1940 MRN: [id number]    Last Discharge Facility       Date Complaint Diagnosis Description Type Department Provider    10/20/23 Weight Gain Delirium ED to Hosp-Admission (Discharged) (ADMITTED) KEMAR Betts MD; Sarita Brantley. .. Noted following upcoming appointments from discharge chart review:   [facility] follow up appointment(s): No future appointments.   Non-[facility]  follow up appointment(s): na

## 2023-11-21 ENCOUNTER — CARE COORDINATION (OUTPATIENT)
Dept: CARE COORDINATION | Age: 83
End: 2023-11-21

## 2023-11-21 NOTE — CARE COORDINATION
Jimmy Celaya  11/21/2023    Registered Dietitian Progress Note for Care Coordination    Assessment: Scout Hoang is a 82 y.o. female. RD referred for Diabetes and CHF. RD spoke with patient's daughter/POA, Aletha Lee for patient's initial nutrition assessment on 10/25/23. RD called to follow up with pt today 11/21/23 and spoke with Aletha Lee. RD discussed previous goals. Aletha Lee states they received the handouts and coupons in the mail but she hasn't read through them yet. RD encouraged Aletha Lee to take the time to read through the handouts. RD reiterated the importance of patient eating 3 meals/day and following a low sodium diet closely. Aletha Lee states patient is eating 3 meals/day with a snack before bed. Aletha Lee states this morning for breakfast patient ate two eggs, two slices of low sodium ariza, honey nut cheerios with almond milk, two oranges and two slices of keto bread with no sugar strawberry jam. Aletha Lee states she is keeping track of patient's carbohydrate intake per meal. RD reviewed the components of a balanced meal using MyPlate and reiterated the importance of incorporating a protein source to each meal and snack. Aletha Lee states patient has been eating a snack before bed, examples provided include peanut butter jelly on keto bread, peanut butter with an apple, cashews and 1/2 cup popcorn. RD reiterated the importance of taking medicine as directed and checking BS daily. Aletha Lee states patient's FBS is averaging around 150 mg/dL. Reiterated the importance of monitoring daily weights- Chevyjonathan Rosa states patient's weight this  lb, no sudden weight gain reported. No nutrition related questions or concerns at this time. Nutrition Monitoring and Evaluation  Indicator/Goal Criteria Progress   #1 Eat balanced meals consistently throughout the day.   #1 Focus on eating 3 meals/day and make these meals balanced using the MyPlate [id number]-6/9 plate fruits and/or vegetables, 1/4 plate protein and 1/4 plate starchy carbohydrates with

## 2023-12-18 RX ORDER — INSULIN DETEMIR 100 [IU]/ML
INJECTION, SOLUTION SUBCUTANEOUS
Qty: 3 ML | Refills: 0 | OUTPATIENT
Start: 2023-12-18

## 2024-05-22 RX ORDER — FERROUS SULFATE 325(65) MG
1 TABLET ORAL EVERY OTHER DAY
Qty: 45 TABLET | Refills: 0 | OUTPATIENT
Start: 2024-05-22

## 2024-07-26 PROBLEM — J96.90 RESPIRATORY FAILURE, UNSPECIFIED CHRONICITY, UNSPECIFIED WHETHER WITH HYPOXIA OR HYPERCAPNIA (HCC): Status: ACTIVE | Noted: 2024-07-26

## 2024-07-29 PROBLEM — G30.9 ALZHEIMER'S DISEASE, UNSPECIFIED (CODE) (HCC): Status: ACTIVE | Noted: 2024-07-29

## 2025-01-22 PROBLEM — E66.01 MORBID (SEVERE) OBESITY DUE TO EXCESS CALORIES: Status: ACTIVE | Noted: 2025-01-22

## 2025-01-22 PROBLEM — J96.90 RESPIRATORY FAILURE, UNSPECIFIED CHRONICITY, UNSPECIFIED WHETHER WITH HYPOXIA OR HYPERCAPNIA (HCC): Status: RESOLVED | Noted: 2024-07-26 | Resolved: 2025-01-22

## 2025-02-18 ENCOUNTER — HOSPITAL ENCOUNTER (OUTPATIENT)
Age: 85
Discharge: HOME | End: 2025-02-18
Payer: MEDICARE

## 2025-02-18 DIAGNOSIS — F03.A0 MILD DEMENTIA, UNSPECIFIED DEMENTIA TYPE, UNSPECIFIED WHETHER BEHAVIORAL, PSYCHOTIC, OR MOOD DISTURBANCE OR ANXIETY (HCC): ICD-10-CM

## 2025-02-18 DIAGNOSIS — E87.5 HYPERKALEMIA: ICD-10-CM

## 2025-02-18 PROCEDURE — 36415 COLL VENOUS BLD VENIPUNCTURE: CPT

## 2025-02-18 PROCEDURE — 80048 BASIC METABOLIC PNL TOTAL CA: CPT

## 2025-02-19 LAB
ANION GAP SERPL CALC-SCNC: 12 MEQ/L (ref 8–16)
BUN SERPL-MCNC: 63 MG/DL (ref 7–22)
CALCIUM SERPL-MCNC: 11.1 MG/DL (ref 8.2–9.6)
CHLORIDE SERPL-SCNC: 97 MEQ/L (ref 98–111)
CO2 SERPL-SCNC: 28 MEQ/L (ref 23–33)
CREAT SERPL-MCNC: 1.9 MG/DL (ref 0.4–1.2)
GFR SERPL CREATININE-BSD FRML MDRD: 26 ML/MIN/1.73M2
GLUCOSE SERPL-MCNC: 101 MG/DL (ref 70–108)
POTASSIUM SERPL-SCNC: 5.3 MEQ/L (ref 3.5–5.2)
SODIUM SERPL-SCNC: 137 MEQ/L (ref 135–145)

## 2025-03-04 ENCOUNTER — OFFICE VISIT (OUTPATIENT)
Dept: NEPHROLOGY | Age: 85
End: 2025-03-04
Payer: MEDICARE

## 2025-03-04 VITALS — HEART RATE: 73 BPM | OXYGEN SATURATION: 93 % | DIASTOLIC BLOOD PRESSURE: 48 MMHG | SYSTOLIC BLOOD PRESSURE: 160 MMHG

## 2025-03-04 DIAGNOSIS — N18.32 STAGE 3B CHRONIC KIDNEY DISEASE (HCC): Primary | ICD-10-CM

## 2025-03-04 DIAGNOSIS — I10 ESSENTIAL HYPERTENSION: ICD-10-CM

## 2025-03-04 PROBLEM — I87.2 CHRONIC VENOUS INSUFFICIENCY: Status: ACTIVE | Noted: 2018-12-11

## 2025-03-04 PROBLEM — R47.01 APHASIA: Status: ACTIVE | Noted: 2025-03-04

## 2025-03-04 PROBLEM — F41.1 GENERALIZED ANXIETY DISORDER: Status: ACTIVE | Noted: 2025-03-04

## 2025-03-04 PROCEDURE — 99204 OFFICE O/P NEW MOD 45 MIN: CPT | Performed by: INTERNAL MEDICINE

## 2025-03-04 PROCEDURE — 1036F TOBACCO NON-USER: CPT | Performed by: INTERNAL MEDICINE

## 2025-03-04 PROCEDURE — 1159F MED LIST DOCD IN RCRD: CPT | Performed by: INTERNAL MEDICINE

## 2025-03-04 PROCEDURE — G8427 DOCREV CUR MEDS BY ELIG CLIN: HCPCS | Performed by: INTERNAL MEDICINE

## 2025-03-04 PROCEDURE — 3077F SYST BP >= 140 MM HG: CPT | Performed by: INTERNAL MEDICINE

## 2025-03-04 PROCEDURE — G8417 CALC BMI ABV UP PARAM F/U: HCPCS | Performed by: INTERNAL MEDICINE

## 2025-03-04 PROCEDURE — 1123F ACP DISCUSS/DSCN MKR DOCD: CPT | Performed by: INTERNAL MEDICINE

## 2025-03-04 PROCEDURE — 3078F DIAST BP <80 MM HG: CPT | Performed by: INTERNAL MEDICINE

## 2025-03-04 PROCEDURE — 1090F PRES/ABSN URINE INCON ASSESS: CPT | Performed by: INTERNAL MEDICINE

## 2025-03-04 PROCEDURE — G8400 PT W/DXA NO RESULTS DOC: HCPCS | Performed by: INTERNAL MEDICINE

## 2025-03-04 ASSESSMENT — ENCOUNTER SYMPTOMS
DIARRHEA: 0
COUGH: 0
SHORTNESS OF BREATH: 0
SORE THROAT: 0
EYE PAIN: 0
NAUSEA: 0
VOMITING: 0
EYES NEGATIVE: 1
ABDOMINAL PAIN: 0
BACK PAIN: 0

## 2025-03-04 NOTE — PROGRESS NOTES
range of motion and neck supple.      Right lower leg: Edema present.      Left lower leg: Edema present.      Comments: Mild, with chronic skin changes   Skin:     General: Skin is warm and dry.      Findings: No rash.   Neurological:      General: No focal deficit present.      Mental Status: She is alert and oriented to person, place, and time.   Psychiatric:         Mood and Affect: Mood normal.         Behavior: Behavior normal.          Vitals   Vitals:    03/04/25 1408   BP: (!) 160/48   Site: Left Upper Arm   Position: Sitting   Cuff Size: Medium Adult   Pulse: 73   SpO2: 93%        Labs, Radiology and Tests :    Labs -    2/25           potassium 5.3           BUN 63           Creatinine 1.9           eGFR 26                       UPCR            [facility]                          Renal Ultrasound Scan -- will order       Other : old  discharge summary noted and noted apparently her baseline is around 1.7  have been reviewed .      Assessment    Renal -patient most likely has a chronic kidney disease stage IV possibly due to senile nephrosclerosis longstanding hypertension and diabetes  -She is also on multiple nephrotoxins including Bumex, Jardiance, losartan, Aldactone  -May need to consider adjusting her medications to some degree  -Meanwhile we will repeat her lab work along with the urine studies and a renal ultrasound scan  -Encouraged to drink at least 60 to 70 ounces of liquids per day  Essential hypertension running high on multiple medications advised home blood pressure monitoring  Chronic lymphedema possibly with chronic skin changes  Diabetes mellitus evaluate for any diabetic nephropathy  Dementia  Discussed with the patient and daughter in detail  Plan   No orders of the defined types were placed in this encounter.      Dylan Smith M.D  [facility].

## 2025-03-13 LAB
ALBUMIN: 3.8 G/DL
ALP BLD-CCNC: 67 U/L
ALT SERPL-CCNC: 12 U/L
ANION GAP SERPL CALCULATED.3IONS-SCNC: 9 MMOL/L
AST SERPL-CCNC: 16 U/L
BILIRUB SERPL-MCNC: 1.2 MG/DL (ref 0.1–1.4)
BUN BLDV-MCNC: 34 MG/DL
CALCIUM SERPL-MCNC: 10.1 MG/DL
CHLORIDE BLD-SCNC: 97 MMOL/L
CO2: 29 MMOL/L
CREAT SERPL-MCNC: 1.66 MG/DL
CREATININE URINE: 16.6 MG/DL
GFR, ESTIMATED: 31
GLUCOSE BLD-MCNC: 94 MG/DL
MICROALBUMIN/CREAT 24H UR: <2 MG/DL
MICROALBUMIN/CREAT UR-RTO: <12 MG/G
MICROSCOPIC URINE: NORMAL
POTASSIUM SERPL-SCNC: 4.4 MMOL/L
SODIUM BLD-SCNC: 135 MMOL/L
TOTAL PROTEIN: 7.1 G/DL (ref 6.4–8.2)

## 2025-03-18 ENCOUNTER — OFFICE VISIT (OUTPATIENT)
Dept: NEPHROLOGY | Age: 85
End: 2025-03-18
Payer: MEDICARE

## 2025-03-18 VITALS
WEIGHT: 235 LBS | SYSTOLIC BLOOD PRESSURE: 126 MMHG | HEART RATE: 72 BPM | DIASTOLIC BLOOD PRESSURE: 78 MMHG | BODY MASS INDEX: 45.9 KG/M2 | OXYGEN SATURATION: 96 %

## 2025-03-18 DIAGNOSIS — N18.32 STAGE 3B CHRONIC KIDNEY DISEASE (HCC): Primary | ICD-10-CM

## 2025-03-18 DIAGNOSIS — I10 ESSENTIAL HYPERTENSION: ICD-10-CM

## 2025-03-18 DIAGNOSIS — E87.79 OTHER FLUID OVERLOAD: ICD-10-CM

## 2025-03-18 PROCEDURE — 99213 OFFICE O/P EST LOW 20 MIN: CPT | Performed by: INTERNAL MEDICINE

## 2025-03-18 PROCEDURE — 1036F TOBACCO NON-USER: CPT | Performed by: INTERNAL MEDICINE

## 2025-03-18 PROCEDURE — 1090F PRES/ABSN URINE INCON ASSESS: CPT | Performed by: INTERNAL MEDICINE

## 2025-03-18 PROCEDURE — 3074F SYST BP LT 130 MM HG: CPT | Performed by: INTERNAL MEDICINE

## 2025-03-18 PROCEDURE — 1123F ACP DISCUSS/DSCN MKR DOCD: CPT | Performed by: INTERNAL MEDICINE

## 2025-03-18 PROCEDURE — 3078F DIAST BP <80 MM HG: CPT | Performed by: INTERNAL MEDICINE

## 2025-03-18 PROCEDURE — G2211 COMPLEX E/M VISIT ADD ON: HCPCS | Performed by: INTERNAL MEDICINE

## 2025-03-18 PROCEDURE — G8427 DOCREV CUR MEDS BY ELIG CLIN: HCPCS | Performed by: INTERNAL MEDICINE

## 2025-03-18 PROCEDURE — G8400 PT W/DXA NO RESULTS DOC: HCPCS | Performed by: INTERNAL MEDICINE

## 2025-03-18 PROCEDURE — G8417 CALC BMI ABV UP PARAM F/U: HCPCS | Performed by: INTERNAL MEDICINE

## 2025-03-18 PROCEDURE — 1159F MED LIST DOCD IN RCRD: CPT | Performed by: INTERNAL MEDICINE

## (undated) DEVICE — GLOVE ORTHO 8   MSG9480